# Patient Record
Sex: MALE | Race: OTHER | Employment: OTHER | URBAN - METROPOLITAN AREA
[De-identification: names, ages, dates, MRNs, and addresses within clinical notes are randomized per-mention and may not be internally consistent; named-entity substitution may affect disease eponyms.]

---

## 2017-06-27 PROBLEM — M81.0 OSTEOPOROSIS: Status: ACTIVE | Noted: 2017-06-27

## 2017-06-27 PROBLEM — I77.9 BILATERAL CAROTID ARTERY DISEASE (HCC): Status: ACTIVE | Noted: 2017-06-27

## 2017-06-29 PROCEDURE — 82607 VITAMIN B-12: CPT | Performed by: INTERNAL MEDICINE

## 2017-06-29 PROCEDURE — 82746 ASSAY OF FOLIC ACID SERUM: CPT | Performed by: INTERNAL MEDICINE

## 2017-07-26 PROCEDURE — 84165 PROTEIN E-PHORESIS SERUM: CPT | Performed by: INTERNAL MEDICINE

## 2017-07-26 PROCEDURE — 83883 ASSAY NEPHELOMETRY NOT SPEC: CPT | Performed by: INTERNAL MEDICINE

## 2017-07-26 PROCEDURE — 86334 IMMUNOFIX E-PHORESIS SERUM: CPT | Performed by: INTERNAL MEDICINE

## 2017-07-26 PROCEDURE — 84156 ASSAY OF PROTEIN URINE: CPT | Performed by: INTERNAL MEDICINE

## 2017-07-26 PROCEDURE — 86141 C-REACTIVE PROTEIN HS: CPT | Performed by: INTERNAL MEDICINE

## 2017-07-26 PROCEDURE — 86335 IMMUNFIX E-PHORSIS/URINE/CSF: CPT | Performed by: INTERNAL MEDICINE

## 2018-07-17 PROBLEM — I70.0 AORTIC ATHEROSCLEROSIS (HCC): Status: ACTIVE | Noted: 2018-07-17

## 2018-07-31 PROCEDURE — 86141 C-REACTIVE PROTEIN HS: CPT | Performed by: INTERNAL MEDICINE

## 2018-07-31 PROCEDURE — 84165 PROTEIN E-PHORESIS SERUM: CPT | Performed by: INTERNAL MEDICINE

## 2018-07-31 PROCEDURE — 83883 ASSAY NEPHELOMETRY NOT SPEC: CPT | Performed by: INTERNAL MEDICINE

## 2018-07-31 PROCEDURE — 86334 IMMUNOFIX E-PHORESIS SERUM: CPT | Performed by: INTERNAL MEDICINE

## 2018-08-28 PROBLEM — E44.1 MILD PROTEIN-CALORIE MALNUTRITION (HCC): Status: ACTIVE | Noted: 2018-08-28

## 2018-08-28 PROBLEM — D89.0 POLYCLONAL GAMMOPATHY DETERMINED BY SERUM PROTEIN ELECTROPHORESIS: Status: ACTIVE | Noted: 2018-08-28

## 2019-03-17 PROBLEM — R53.82 CHRONIC FATIGUE: Status: ACTIVE | Noted: 2019-03-17

## 2019-03-17 PROBLEM — G62.9 SMALL FIBER NEUROPATHY: Status: ACTIVE | Noted: 2019-03-17

## 2019-03-17 PROBLEM — I77.9 ARTERIAL DISEASE (HCC): Status: ACTIVE | Noted: 2019-03-17

## 2020-07-10 ENCOUNTER — LAB ENCOUNTER (OUTPATIENT)
Dept: LAB | Facility: HOSPITAL | Age: 85
End: 2020-07-10
Attending: INTERNAL MEDICINE
Payer: MEDICARE

## 2020-07-10 DIAGNOSIS — R63.4 WEIGHT LOSS: ICD-10-CM

## 2020-07-10 LAB
ALBUMIN SERPL-MCNC: 3.5 G/DL (ref 3.4–5)
ALBUMIN/GLOB SERPL: 0.9 {RATIO} (ref 1–2)
ALP LIVER SERPL-CCNC: 103 U/L (ref 45–117)
ALT SERPL-CCNC: 14 U/L (ref 16–61)
ANION GAP SERPL CALC-SCNC: 4 MMOL/L (ref 0–18)
AST SERPL-CCNC: 24 U/L (ref 15–37)
BASOPHILS # BLD AUTO: 0.02 X10(3) UL (ref 0–0.2)
BASOPHILS NFR BLD AUTO: 0.3 %
BILIRUB SERPL-MCNC: 0.7 MG/DL (ref 0.1–2)
BUN BLD-MCNC: 11 MG/DL (ref 7–18)
BUN/CREAT SERPL: 12.2 (ref 10–20)
CALCIUM BLD-MCNC: 8.7 MG/DL (ref 8.5–10.1)
CHLORIDE SERPL-SCNC: 88 MMOL/L (ref 98–112)
CO2 SERPL-SCNC: 31 MMOL/L (ref 21–32)
CREAT BLD-MCNC: 0.9 MG/DL (ref 0.7–1.3)
DEPRECATED RDW RBC AUTO: 43.6 FL (ref 35.1–46.3)
EOSINOPHIL # BLD AUTO: 0.13 X10(3) UL (ref 0–0.7)
EOSINOPHIL NFR BLD AUTO: 2.1 %
ERYTHROCYTE [DISTWIDTH] IN BLOOD BY AUTOMATED COUNT: 13.2 % (ref 11–15)
GLOBULIN PLAS-MCNC: 3.8 G/DL (ref 2.8–4.4)
GLUCOSE BLD-MCNC: 93 MG/DL (ref 70–99)
HCT VFR BLD AUTO: 40.2 % (ref 39–53)
HGB BLD-MCNC: 13.7 G/DL (ref 13–17.5)
IMM GRANULOCYTES # BLD AUTO: 0.03 X10(3) UL (ref 0–1)
IMM GRANULOCYTES NFR BLD: 0.5 %
LYMPHOCYTES # BLD AUTO: 1.15 X10(3) UL (ref 1–4)
LYMPHOCYTES NFR BLD AUTO: 18.3 %
M PROTEIN MFR SERPL ELPH: 7.3 G/DL (ref 6.4–8.2)
MCH RBC QN AUTO: 30.7 PG (ref 26–34)
MCHC RBC AUTO-ENTMCNC: 34.1 G/DL (ref 31–37)
MCV RBC AUTO: 90.1 FL (ref 80–100)
MONOCYTES # BLD AUTO: 0.65 X10(3) UL (ref 0.1–1)
MONOCYTES NFR BLD AUTO: 10.4 %
NEUTROPHILS # BLD AUTO: 4.3 X10 (3) UL (ref 1.5–7.7)
NEUTROPHILS # BLD AUTO: 4.3 X10(3) UL (ref 1.5–7.7)
NEUTROPHILS NFR BLD AUTO: 68.4 %
OSMOLALITY SERPL CALC.SUM OF ELEC: 255 MOSM/KG (ref 275–295)
PATIENT FASTING Y/N/NP: NO
PLATELET # BLD AUTO: 249 10(3)UL (ref 150–450)
POTASSIUM SERPL-SCNC: 4.3 MMOL/L (ref 3.5–5.1)
RBC # BLD AUTO: 4.46 X10(6)UL (ref 3.8–5.8)
SODIUM SERPL-SCNC: 123 MMOL/L (ref 136–145)
WBC # BLD AUTO: 6.3 X10(3) UL (ref 4–11)

## 2020-07-10 PROCEDURE — 80053 COMPREHEN METABOLIC PANEL: CPT

## 2020-07-10 PROCEDURE — 85025 COMPLETE CBC W/AUTO DIFF WBC: CPT

## 2020-07-10 PROCEDURE — 36415 COLL VENOUS BLD VENIPUNCTURE: CPT

## 2020-08-25 ENCOUNTER — HOSPITAL ENCOUNTER (OUTPATIENT)
Dept: CT IMAGING | Facility: HOSPITAL | Age: 85
Discharge: HOME | End: 2020-08-25
Attending: INTERNAL MEDICINE
Payer: MEDICARE

## 2020-08-25 ENCOUNTER — LAB ENCOUNTER (OUTPATIENT)
Dept: LAB | Facility: HOSPITAL | Age: 85
End: 2020-08-25
Attending: INTERNAL MEDICINE
Payer: MEDICARE

## 2020-08-25 DIAGNOSIS — R68.81 EARLY SATIETY: ICD-10-CM

## 2020-08-25 DIAGNOSIS — R19.4 CHANGE IN BOWEL HABITS: ICD-10-CM

## 2020-08-25 DIAGNOSIS — R63.4 WEIGHT LOSS: ICD-10-CM

## 2020-08-25 DIAGNOSIS — R53.82 CHRONIC FATIGUE: ICD-10-CM

## 2020-08-25 LAB
ALBUMIN SERPL-MCNC: 3.2 G/DL (ref 3.4–5)
ALBUMIN/GLOB SERPL: 0.9 {RATIO} (ref 1–2)
ALP LIVER SERPL-CCNC: 100 U/L (ref 45–117)
ALT SERPL-CCNC: 11 U/L (ref 16–61)
ANION GAP SERPL CALC-SCNC: 2 MMOL/L (ref 0–18)
AST SERPL-CCNC: 18 U/L (ref 15–37)
BILIRUB SERPL-MCNC: 0.5 MG/DL (ref 0.1–2)
BUN BLD-MCNC: 11 MG/DL (ref 7–18)
BUN/CREAT SERPL: 14.3 (ref 10–20)
CALCIUM BLD-MCNC: 9 MG/DL (ref 8.5–10.1)
CHLORIDE SERPL-SCNC: 92 MMOL/L (ref 98–112)
CO2 SERPL-SCNC: 33 MMOL/L (ref 21–32)
CREAT BLD-MCNC: 0.77 MG/DL (ref 0.7–1.3)
GLOBULIN PLAS-MCNC: 3.6 G/DL (ref 2.8–4.4)
GLUCOSE BLD-MCNC: 97 MG/DL (ref 70–99)
M PROTEIN MFR SERPL ELPH: 6.8 G/DL (ref 6.4–8.2)
OSMOLALITY SERPL CALC.SUM OF ELEC: 263 MOSM/KG (ref 275–295)
PATIENT FASTING Y/N/NP: YES
POTASSIUM SERPL-SCNC: 4.2 MMOL/L (ref 3.5–5.1)
SODIUM SERPL-SCNC: 127 MMOL/L (ref 136–145)

## 2020-08-25 PROCEDURE — 80053 COMPREHEN METABOLIC PANEL: CPT

## 2020-08-25 PROCEDURE — 74177 CT ABD & PELVIS W/CONTRAST: CPT | Performed by: INTERNAL MEDICINE

## 2020-08-25 PROCEDURE — 36415 COLL VENOUS BLD VENIPUNCTURE: CPT

## 2020-08-25 NOTE — PROGRESS NOTES
Please notify patient that his blood tests look like he needs more calories and more salt. No liver or kidney abnormalities.

## 2021-04-12 DIAGNOSIS — Z23 NEED FOR VACCINATION: ICD-10-CM

## 2021-05-23 PROBLEM — I65.23 BILATERAL CAROTID ARTERY STENOSIS: Status: ACTIVE | Noted: 2021-05-23

## 2021-05-23 PROBLEM — I77.9 BILATERAL CAROTID ARTERY DISEASE (HCC): Status: RESOLVED | Noted: 2017-06-27 | Resolved: 2021-05-23

## 2021-07-23 PROBLEM — S02.2XXA CLOSED FRACTURE OF NASAL BONE, INITIAL ENCOUNTER: Status: ACTIVE | Noted: 2021-07-23

## 2021-10-11 PROBLEM — E87.1 HYPONATREMIA: Status: ACTIVE | Noted: 2021-10-11

## 2021-11-16 ENCOUNTER — HOSPITAL ENCOUNTER (EMERGENCY)
Facility: HOSPITAL | Age: 86
Discharge: HOME | End: 2021-11-16
Attending: EMERGENCY MEDICINE
Payer: MEDICARE

## 2021-11-16 ENCOUNTER — APPOINTMENT (OUTPATIENT)
Dept: GENERAL RADIOLOGY | Facility: HOSPITAL | Age: 86
End: 2021-11-16
Attending: EMERGENCY MEDICINE
Payer: MEDICARE

## 2021-11-16 ENCOUNTER — APPOINTMENT (OUTPATIENT)
Dept: GENERAL RADIOLOGY | Facility: HOSPITAL | Age: 86
End: 2021-11-16
Payer: MEDICARE

## 2021-11-16 VITALS
DIASTOLIC BLOOD PRESSURE: 81 MMHG | HEIGHT: 66.5 IN | OXYGEN SATURATION: 98 % | HEART RATE: 84 BPM | WEIGHT: 90 LBS | TEMPERATURE: 97 F | RESPIRATION RATE: 24 BRPM | SYSTOLIC BLOOD PRESSURE: 192 MMHG | BODY MASS INDEX: 14.29 KG/M2

## 2021-11-16 DIAGNOSIS — S22.42XA CLOSED FRACTURE OF MULTIPLE RIBS OF LEFT SIDE, INITIAL ENCOUNTER: Primary | ICD-10-CM

## 2021-11-16 PROCEDURE — 99284 EMERGENCY DEPT VISIT MOD MDM: CPT | Performed by: EMERGENCY MEDICINE

## 2021-11-16 PROCEDURE — 71046 X-RAY EXAM CHEST 2 VIEWS: CPT | Performed by: EMERGENCY MEDICINE

## 2021-11-16 PROCEDURE — 71101 X-RAY EXAM UNILAT RIBS/CHEST: CPT

## 2021-11-16 RX ORDER — TRAMADOL HYDROCHLORIDE 50 MG/1
TABLET ORAL EVERY 6 HOURS PRN
Qty: 10 TABLET | Refills: 0 | Status: SHIPPED | OUTPATIENT
Start: 2021-11-16 | End: 2021-11-23

## 2021-11-16 RX ORDER — MORPHINE SULFATE 2 MG/ML
2 INJECTION, SOLUTION INTRAMUSCULAR; INTRAVENOUS EVERY 30 MIN PRN
Status: DISCONTINUED | OUTPATIENT
Start: 2021-11-16 | End: 2021-11-16

## 2021-11-16 NOTE — ED INITIAL ASSESSMENT (HPI)
Family states patient was shaving when he turned and fell hitting his left ribs on the toilet. Denies syncope/LOC. Pt c/o pain to left rib cage. Pt denies hitting head.

## 2021-11-16 NOTE — ED PROVIDER NOTES
Patient Seen in: [facility] Emergency Department      History   Patient presents with:  Fall    Stated Complaint: fall    Subjective:   HPI    This is a pleasant 44-year-old male presenting with fall.   Patient shaving when he went to turn and he los Patient was watched for several hours and the patient's repeat chest x-ray several hours later shows an improvement of the tiny apical pneumothorax. These were discussed with on-call pulmonologist for duly.   Patient will be I believe is a good outpatien

## 2021-12-20 ENCOUNTER — LAB ENCOUNTER (OUTPATIENT)
Dept: LAB | Facility: HOSPITAL | Age: 86
End: 2021-12-20
Attending: INTERNAL MEDICINE
Payer: MEDICARE

## 2021-12-20 DIAGNOSIS — E87.1 HYPONATREMIA: ICD-10-CM

## 2021-12-20 DIAGNOSIS — M62.81 MUSCLE WEAKNESS (GENERALIZED): ICD-10-CM

## 2021-12-20 DIAGNOSIS — R53.82 CHRONIC FATIGUE: ICD-10-CM

## 2021-12-20 PROCEDURE — 36415 COLL VENOUS BLD VENIPUNCTURE: CPT

## 2021-12-20 PROCEDURE — 82088 ASSAY OF ALDOSTERONE: CPT

## 2021-12-20 PROCEDURE — 84244 ASSAY OF RENIN: CPT

## 2021-12-20 PROCEDURE — 82533 TOTAL CORTISOL: CPT

## 2021-12-20 PROCEDURE — 82024 ASSAY OF ACTH: CPT

## 2022-01-12 PROBLEM — G31.9 CEREBRAL ATROPHY, MILD (HCC): Status: ACTIVE | Noted: 2022-01-12

## 2022-10-27 ENCOUNTER — ANESTHESIA (OUTPATIENT)
Dept: INTERVENTIONAL RADIOLOGY/VASCULAR | Facility: HOSPITAL | Age: 87
End: 2022-10-27
Payer: MEDICARE

## 2022-10-27 ENCOUNTER — APPOINTMENT (OUTPATIENT)
Dept: CT IMAGING | Facility: HOSPITAL | Age: 87
End: 2022-10-27
Attending: EMERGENCY MEDICINE
Payer: MEDICARE

## 2022-10-27 ENCOUNTER — APPOINTMENT (OUTPATIENT)
Dept: GENERAL RADIOLOGY | Facility: HOSPITAL | Age: 87
End: 2022-10-27
Attending: EMERGENCY MEDICINE
Payer: MEDICARE

## 2022-10-27 ENCOUNTER — HOSPITAL ENCOUNTER (INPATIENT)
Facility: HOSPITAL | Age: 87
LOS: 14 days | Discharge: NURSING, REHAB OR LONG-TERM CARE | End: 2022-11-10
Attending: EMERGENCY MEDICINE | Admitting: INTERNAL MEDICINE
Payer: MEDICARE

## 2022-10-27 ENCOUNTER — HOSPITAL ENCOUNTER (INPATIENT)
Facility: HOSPITAL | Age: 87
LOS: 14 days | Discharge: NURSING, REHAB OR LONG-TERM CARE | End: 2022-11-10
Attending: EMERGENCY MEDICINE | Admitting: INTERNAL MEDICINE
Payer: MEDICARE

## 2022-10-27 ENCOUNTER — APPOINTMENT (OUTPATIENT)
Dept: CT IMAGING | Facility: HOSPITAL | Age: 87
End: 2022-10-27
Payer: MEDICARE

## 2022-10-27 DIAGNOSIS — I63.9 CEREBROVASCULAR ACCIDENT (CVA), UNSPECIFIED MECHANISM (HCC): Primary | ICD-10-CM

## 2022-10-27 PROBLEM — R29.90 STROKE-LIKE SYMPTOM: Status: ACTIVE | Noted: 2022-10-27

## 2022-10-27 LAB
ALBUMIN SERPL-MCNC: 3 G/DL (ref 3.4–5)
ALBUMIN/GLOB SERPL: 0.7 {RATIO} (ref 1–2)
ALP LIVER SERPL-CCNC: 130 U/L
ALT SERPL-CCNC: 10 U/L
ANION GAP SERPL CALC-SCNC: 7 MMOL/L (ref 0–18)
APTT PPP: 29.2 SECONDS (ref 23.3–35.6)
ARTERIAL PATENCY WRIST A: POSITIVE
AST SERPL-CCNC: 19 U/L (ref 15–37)
ATRIAL RATE: 61 BPM
BASE EXCESS BLDA CALC-SCNC: 6.2 MMOL/L (ref ?–2)
BASOPHILS # BLD AUTO: 0.03 X10(3) UL (ref 0–0.2)
BASOPHILS NFR BLD AUTO: 0.3 %
BILIRUB SERPL-MCNC: 0.6 MG/DL (ref 0.1–2)
BODY TEMPERATURE: 98.6 F
BUN BLD-MCNC: 13 MG/DL (ref 7–18)
CA-I BLD-SCNC: 1.18 MMOL/L (ref 0.95–1.32)
CALCIUM BLD-MCNC: 9 MG/DL (ref 8.5–10.1)
CHLORIDE SERPL-SCNC: 93 MMOL/L (ref 98–112)
CHOLEST SERPL-MCNC: 211 MG/DL (ref ?–200)
CO2 SERPL-SCNC: 29 MMOL/L (ref 21–32)
COHGB MFR BLD: 1.4 % SAT (ref 0–3)
CREAT BLD-MCNC: 0.71 MG/DL
EOSINOPHIL # BLD AUTO: 0.2 X10(3) UL (ref 0–0.7)
EOSINOPHIL NFR BLD AUTO: 2.3 %
ERYTHROCYTE [DISTWIDTH] IN BLOOD BY AUTOMATED COUNT: 13.2 %
EST. AVERAGE GLUCOSE BLD GHB EST-MCNC: 126 MG/DL (ref 68–126)
FIO2: 21 %
GFR SERPLBLD BASED ON 1.73 SQ M-ARVRAT: 90 ML/MIN/1.73M2 (ref 60–?)
GLOBULIN PLAS-MCNC: 4.4 G/DL (ref 2.8–4.4)
GLUCOSE BLD-MCNC: 131 MG/DL (ref 70–99)
GLUCOSE BLD-MCNC: 135 MG/DL (ref 70–99)
GLUCOSE BLD-MCNC: 196 MG/DL (ref 70–99)
HBA1C MFR BLD: 6 % (ref ?–5.7)
HCO3 BLDA-SCNC: 29.7 MEQ/L (ref 21–27)
HCT VFR BLD AUTO: 43.9 %
HDLC SERPL-MCNC: 72 MG/DL (ref 40–59)
HGB BLD-MCNC: 14.7 G/DL
HGB BLD-MCNC: 14.9 G/DL
IMM GRANULOCYTES # BLD AUTO: 0.04 X10(3) UL (ref 0–1)
IMM GRANULOCYTES NFR BLD: 0.5 %
INR BLD: 0.98 (ref 0.85–1.16)
LACTATE BLD-SCNC: 1.3 MMOL/L (ref 0.5–2)
LDLC SERPL CALC-MCNC: 127 MG/DL (ref ?–100)
LYMPHOCYTES # BLD AUTO: 0.98 X10(3) UL (ref 1–4)
LYMPHOCYTES NFR BLD AUTO: 11.1 %
MCH RBC QN AUTO: 30.8 PG (ref 26–34)
MCHC RBC AUTO-ENTMCNC: 33.9 G/DL (ref 31–37)
MCV RBC AUTO: 90.9 FL
METHGB MFR BLD: 0.5 % SAT (ref 0.4–1.5)
MONOCYTES # BLD AUTO: 0.74 X10(3) UL (ref 0.1–1)
MONOCYTES NFR BLD AUTO: 8.4 %
NEUTROPHILS # BLD AUTO: 6.87 X10 (3) UL (ref 1.5–7.7)
NEUTROPHILS # BLD AUTO: 6.87 X10(3) UL (ref 1.5–7.7)
NEUTROPHILS NFR BLD AUTO: 77.4 %
NONHDLC SERPL-MCNC: 139 MG/DL (ref ?–130)
OSMOLALITY SERPL CALC.SUM OF ELEC: 270 MOSM/KG (ref 275–295)
OXYHGB MFR BLDA: 95.2 % (ref 92–100)
P-R INTERVAL: 122 MS
P2Y12 REACTION UNITS: 159 PRU
PCO2 BLDA: 42 MM HG (ref 35–45)
PH BLDA: 7.47 [PH] (ref 7.35–7.45)
PLATELET # BLD AUTO: 337 10(3)UL (ref 150–450)
PO2 BLDA: 79 MM HG (ref 80–100)
POTASSIUM BLD-SCNC: 4.2 MMOL/L (ref 3.6–5.1)
POTASSIUM SERPL-SCNC: 4.2 MMOL/L (ref 3.5–5.1)
PROT SERPL-MCNC: 7.4 G/DL (ref 6.4–8.2)
PROTHROMBIN TIME: 13 SECONDS (ref 11.6–14.8)
Q-T INTERVAL: 438 MS
QRS DURATION: 92 MS
QTC CALCULATION (BEZET): 440 MS
R AXIS: -54 DEGREES
RBC # BLD AUTO: 4.83 X10(6)UL
SARS-COV-2 RNA RESP QL NAA+PROBE: NOT DETECTED
SODIUM BLD-SCNC: 125 MMOL/L (ref 135–145)
SODIUM SERPL-SCNC: 129 MMOL/L (ref 136–145)
T AXIS: 38 DEGREES
TRIGL SERPL-MCNC: 66 MG/DL (ref 30–149)
TROPONIN I HIGH SENSITIVITY: 58 NG/L
VENTRICULAR RATE: 61 BPM
VLDLC SERPL CALC-MCNC: 12 MG/DL (ref 0–30)
WBC # BLD AUTO: 8.9 X10(3) UL (ref 4–11)

## 2022-10-27 PROCEDURE — 3E033PZ INTRODUCTION OF PLATELET INHIBITOR INTO PERIPHERAL VEIN, PERCUTANEOUS APPROACH: ICD-10-PCS

## 2022-10-27 PROCEDURE — 70498 CT ANGIOGRAPHY NECK: CPT | Performed by: EMERGENCY MEDICINE

## 2022-10-27 PROCEDURE — 70450 CT HEAD/BRAIN W/O DYE: CPT

## 2022-10-27 PROCEDURE — 0042T CT STROKE(DAWN BRAIN)CTA BRAIN/CTA NECK+PERF(CPT=70496/70498/0042T): CPT | Performed by: EMERGENCY MEDICINE

## 2022-10-27 PROCEDURE — C9460 INJECTION, CANGRELOR: HCPCS | Performed by: ANESTHESIOLOGY

## 2022-10-27 PROCEDURE — 99292 CRITICAL CARE ADDL 30 MIN: CPT | Performed by: INTERNAL MEDICINE

## 2022-10-27 PROCEDURE — 99291 CRITICAL CARE FIRST HOUR: CPT | Performed by: INTERNAL MEDICINE

## 2022-10-27 PROCEDURE — 03CL3ZZ EXTIRPATION OF MATTER FROM LEFT INTERNAL CAROTID ARTERY, PERCUTANEOUS APPROACH: ICD-10-PCS

## 2022-10-27 PROCEDURE — 76942 ECHO GUIDE FOR BIOPSY: CPT | Performed by: ANESTHESIOLOGY

## 2022-10-27 PROCEDURE — 3078F DIAST BP <80 MM HG: CPT | Performed by: INTERNAL MEDICINE

## 2022-10-27 PROCEDURE — 71045 X-RAY EXAM CHEST 1 VIEW: CPT | Performed by: EMERGENCY MEDICINE

## 2022-10-27 PROCEDURE — 70450 CT HEAD/BRAIN W/O DYE: CPT | Performed by: EMERGENCY MEDICINE

## 2022-10-27 PROCEDURE — B314YZZ FLUOROSCOPY OF LEFT COMMON CAROTID ARTERY USING OTHER CONTRAST: ICD-10-PCS

## 2022-10-27 PROCEDURE — 3077F SYST BP >= 140 MM HG: CPT | Performed by: INTERNAL MEDICINE

## 2022-10-27 PROCEDURE — 70496 CT ANGIOGRAPHY HEAD: CPT | Performed by: EMERGENCY MEDICINE

## 2022-10-27 RX ORDER — METOCLOPRAMIDE HYDROCHLORIDE 5 MG/ML
10 INJECTION INTRAMUSCULAR; INTRAVENOUS EVERY 8 HOURS PRN
Status: DISCONTINUED | OUTPATIENT
Start: 2022-10-27 | End: 2022-11-10

## 2022-10-27 RX ORDER — HYDRALAZINE HYDROCHLORIDE 20 MG/ML
INJECTION INTRAMUSCULAR; INTRAVENOUS
Status: COMPLETED
Start: 2022-10-27 | End: 2022-10-27

## 2022-10-27 RX ORDER — IODIXANOL 320 MG/ML
100 INJECTION, SOLUTION INTRAVASCULAR
Status: COMPLETED | OUTPATIENT
Start: 2022-10-27 | End: 2022-10-27

## 2022-10-27 RX ORDER — ACETAMINOPHEN 650 MG/1
650 SUPPOSITORY RECTAL EVERY 4 HOURS PRN
Status: DISCONTINUED | OUTPATIENT
Start: 2022-10-27 | End: 2022-11-10

## 2022-10-27 RX ORDER — HYDROMORPHONE HYDROCHLORIDE 1 MG/ML
0.6 INJECTION, SOLUTION INTRAMUSCULAR; INTRAVENOUS; SUBCUTANEOUS EVERY 5 MIN PRN
Status: ACTIVE | OUTPATIENT
Start: 2022-10-27 | End: 2022-10-28

## 2022-10-27 RX ORDER — ASPIRIN 300 MG/1
300 SUPPOSITORY RECTAL DAILY
Status: DISCONTINUED | OUTPATIENT
Start: 2022-10-27 | End: 2022-10-30

## 2022-10-27 RX ORDER — WATER 1000 ML/1000ML
INJECTION, SOLUTION INTRAVENOUS
Status: COMPLETED
Start: 2022-10-27 | End: 2022-10-27

## 2022-10-27 RX ORDER — ONDANSETRON 2 MG/ML
4 INJECTION INTRAMUSCULAR; INTRAVENOUS EVERY 6 HOURS PRN
Status: DISCONTINUED | OUTPATIENT
Start: 2022-10-27 | End: 2022-10-27

## 2022-10-27 RX ORDER — METOCLOPRAMIDE HYDROCHLORIDE 5 MG/ML
10 INJECTION INTRAMUSCULAR; INTRAVENOUS EVERY 8 HOURS PRN
Status: DISCONTINUED | OUTPATIENT
Start: 2022-10-27 | End: 2022-10-27

## 2022-10-27 RX ORDER — HYDROCODONE BITARTRATE AND ACETAMINOPHEN 10; 325 MG/1; MG/1
2 TABLET ORAL ONCE AS NEEDED
Status: ACTIVE | OUTPATIENT
Start: 2022-10-27 | End: 2022-10-27

## 2022-10-27 RX ORDER — LABETALOL HYDROCHLORIDE 5 MG/ML
INJECTION, SOLUTION INTRAVENOUS
Status: DISPENSED
Start: 2022-10-27 | End: 2022-10-28

## 2022-10-27 RX ORDER — HEPARIN SODIUM 5000 [USP'U]/ML
5000 INJECTION, SOLUTION INTRAVENOUS; SUBCUTANEOUS EVERY 12 HOURS SCHEDULED
Status: DISCONTINUED | OUTPATIENT
Start: 2022-10-27 | End: 2022-10-30

## 2022-10-27 RX ORDER — SODIUM CHLORIDE, SODIUM LACTATE, POTASSIUM CHLORIDE, CALCIUM CHLORIDE 600; 310; 30; 20 MG/100ML; MG/100ML; MG/100ML; MG/100ML
INJECTION, SOLUTION INTRAVENOUS CONTINUOUS
Status: DISCONTINUED | OUTPATIENT
Start: 2022-10-27 | End: 2022-10-27

## 2022-10-27 RX ORDER — SODIUM CHLORIDE 9 MG/ML
INJECTION, SOLUTION INTRAVENOUS CONTINUOUS
Status: ACTIVE | OUTPATIENT
Start: 2022-10-27 | End: 2022-10-29

## 2022-10-27 RX ORDER — MIDAZOLAM HYDROCHLORIDE 1 MG/ML
1 INJECTION INTRAMUSCULAR; INTRAVENOUS EVERY 5 MIN PRN
Status: ACTIVE | OUTPATIENT
Start: 2022-10-27 | End: 2022-10-28

## 2022-10-27 RX ORDER — CEFAZOLIN SODIUM/WATER 2 G/20 ML
SYRINGE (ML) INTRAVENOUS
Status: DISCONTINUED
Start: 2022-10-27 | End: 2022-10-27 | Stop reason: WASHOUT

## 2022-10-27 RX ORDER — LABETALOL HYDROCHLORIDE 5 MG/ML
5 INJECTION, SOLUTION INTRAVENOUS EVERY 5 MIN PRN
Status: ACTIVE | OUTPATIENT
Start: 2022-10-27 | End: 2022-10-28

## 2022-10-27 RX ORDER — LABETALOL HYDROCHLORIDE 5 MG/ML
10 INJECTION, SOLUTION INTRAVENOUS EVERY 10 MIN PRN
Status: COMPLETED | OUTPATIENT
Start: 2022-10-27 | End: 2022-10-28

## 2022-10-27 RX ORDER — ONDANSETRON 2 MG/ML
INJECTION INTRAMUSCULAR; INTRAVENOUS AS NEEDED
Status: DISCONTINUED | OUTPATIENT
Start: 2022-10-27 | End: 2022-10-27 | Stop reason: SURG

## 2022-10-27 RX ORDER — SENNOSIDES 8.6 MG
17.2 TABLET ORAL NIGHTLY PRN
Status: DISCONTINUED | OUTPATIENT
Start: 2022-10-27 | End: 2022-11-10

## 2022-10-27 RX ORDER — NALOXONE HYDROCHLORIDE 0.4 MG/ML
80 INJECTION, SOLUTION INTRAMUSCULAR; INTRAVENOUS; SUBCUTANEOUS AS NEEDED
Status: ACTIVE | OUTPATIENT
Start: 2022-10-27 | End: 2022-10-28

## 2022-10-27 RX ORDER — ASPIRIN 325 MG
325 TABLET, DELAYED RELEASE (ENTERIC COATED) ORAL DAILY
Status: DISCONTINUED | OUTPATIENT
Start: 2022-10-27 | End: 2022-10-30

## 2022-10-27 RX ORDER — VERAPAMIL HYDROCHLORIDE 2.5 MG/ML
INJECTION, SOLUTION INTRAVENOUS
Status: DISCONTINUED
Start: 2022-10-27 | End: 2022-10-27 | Stop reason: WASHOUT

## 2022-10-27 RX ORDER — HYDROMORPHONE HYDROCHLORIDE 1 MG/ML
0.4 INJECTION, SOLUTION INTRAMUSCULAR; INTRAVENOUS; SUBCUTANEOUS EVERY 5 MIN PRN
Status: ACTIVE | OUTPATIENT
Start: 2022-10-27 | End: 2022-10-28

## 2022-10-27 RX ORDER — ASPIRIN 325 MG
325 TABLET ORAL DAILY
Status: DISCONTINUED | OUTPATIENT
Start: 2022-10-27 | End: 2022-10-27

## 2022-10-27 RX ORDER — HYDRALAZINE HYDROCHLORIDE 20 MG/ML
10 INJECTION INTRAMUSCULAR; INTRAVENOUS EVERY 2 HOUR PRN
Status: DISCONTINUED | OUTPATIENT
Start: 2022-10-27 | End: 2022-11-10

## 2022-10-27 RX ORDER — ONDANSETRON 2 MG/ML
4 INJECTION INTRAMUSCULAR; INTRAVENOUS EVERY 6 HOURS PRN
Status: DISCONTINUED | OUTPATIENT
Start: 2022-10-27 | End: 2022-11-08 | Stop reason: DRUGHIGH

## 2022-10-27 RX ORDER — DEXAMETHASONE SODIUM PHOSPHATE 4 MG/ML
VIAL (ML) INJECTION AS NEEDED
Status: DISCONTINUED | OUTPATIENT
Start: 2022-10-27 | End: 2022-10-27 | Stop reason: SURG

## 2022-10-27 RX ORDER — SODIUM CHLORIDE 9 MG/ML
INJECTION, SOLUTION INTRAVENOUS CONTINUOUS PRN
Status: DISCONTINUED | OUTPATIENT
Start: 2022-10-27 | End: 2022-10-27 | Stop reason: SURG

## 2022-10-27 RX ORDER — HYDROMORPHONE HYDROCHLORIDE 1 MG/ML
0.2 INJECTION, SOLUTION INTRAMUSCULAR; INTRAVENOUS; SUBCUTANEOUS EVERY 5 MIN PRN
Status: ACTIVE | OUTPATIENT
Start: 2022-10-27 | End: 2022-10-28

## 2022-10-27 RX ORDER — SODIUM PHOSPHATE, DIBASIC AND SODIUM PHOSPHATE, MONOBASIC 7; 19 G/133ML; G/133ML
1 ENEMA RECTAL ONCE AS NEEDED
Status: DISCONTINUED | OUTPATIENT
Start: 2022-10-27 | End: 2022-11-10

## 2022-10-27 RX ORDER — METOCLOPRAMIDE HYDROCHLORIDE 5 MG/ML
INJECTION INTRAMUSCULAR; INTRAVENOUS AS NEEDED
Status: DISCONTINUED | OUTPATIENT
Start: 2022-10-27 | End: 2022-10-27 | Stop reason: SURG

## 2022-10-27 RX ORDER — ACETAMINOPHEN 325 MG/1
650 TABLET ORAL EVERY 4 HOURS PRN
Status: DISCONTINUED | OUTPATIENT
Start: 2022-10-27 | End: 2022-11-10

## 2022-10-27 RX ORDER — MEPERIDINE HYDROCHLORIDE 25 MG/ML
12.5 INJECTION INTRAMUSCULAR; INTRAVENOUS; SUBCUTANEOUS AS NEEDED
Status: DISCONTINUED | OUTPATIENT
Start: 2022-10-27 | End: 2022-10-29

## 2022-10-27 RX ORDER — MORPHINE SULFATE 2 MG/ML
1 INJECTION, SOLUTION INTRAMUSCULAR; INTRAVENOUS EVERY 2 HOUR PRN
Status: DISCONTINUED | OUTPATIENT
Start: 2022-10-27 | End: 2022-10-29

## 2022-10-27 RX ORDER — NOREPINEPHRINE BITARTRATE 1 MG/ML
INJECTION, SOLUTION INTRAVENOUS
Status: COMPLETED
Start: 2022-10-27 | End: 2022-10-27

## 2022-10-27 RX ORDER — BISACODYL 10 MG
10 SUPPOSITORY, RECTAL RECTAL
Status: DISCONTINUED | OUTPATIENT
Start: 2022-10-27 | End: 2022-11-10

## 2022-10-27 RX ORDER — ATORVASTATIN CALCIUM 40 MG/1
40 TABLET, FILM COATED ORAL NIGHTLY
Status: DISCONTINUED | OUTPATIENT
Start: 2022-10-27 | End: 2022-11-10

## 2022-10-27 RX ORDER — ASPIRIN 300 MG/1
300 SUPPOSITORY RECTAL DAILY
Status: DISCONTINUED | OUTPATIENT
Start: 2022-10-27 | End: 2022-10-27

## 2022-10-27 RX ORDER — MELATONIN
3 NIGHTLY PRN
Status: DISCONTINUED | OUTPATIENT
Start: 2022-10-27 | End: 2022-11-10

## 2022-10-27 RX ORDER — MORPHINE SULFATE 2 MG/ML
2 INJECTION, SOLUTION INTRAMUSCULAR; INTRAVENOUS EVERY 2 HOUR PRN
Status: DISCONTINUED | OUTPATIENT
Start: 2022-10-27 | End: 2022-10-29

## 2022-10-27 RX ORDER — HYDROCODONE BITARTRATE AND ACETAMINOPHEN 10; 325 MG/1; MG/1
1 TABLET ORAL ONCE AS NEEDED
Status: ACTIVE | OUTPATIENT
Start: 2022-10-27 | End: 2022-10-27

## 2022-10-27 RX ORDER — IOHEXOL 350 MG/ML
100 INJECTION, SOLUTION INTRAVENOUS
Status: COMPLETED | OUTPATIENT
Start: 2022-10-27 | End: 2022-10-27

## 2022-10-27 RX ORDER — LIDOCAINE HYDROCHLORIDE 10 MG/ML
INJECTION, SOLUTION INFILTRATION; PERINEURAL
Status: COMPLETED
Start: 2022-10-27 | End: 2022-10-27

## 2022-10-27 RX ORDER — HEPARIN SODIUM 5000 [USP'U]/ML
INJECTION, SOLUTION INTRAVENOUS; SUBCUTANEOUS
Status: COMPLETED
Start: 2022-10-27 | End: 2022-10-27

## 2022-10-27 RX ORDER — DEXTROSE AND SODIUM CHLORIDE 5; .45 G/100ML; G/100ML
INJECTION, SOLUTION INTRAVENOUS CONTINUOUS
Status: CANCELLED | OUTPATIENT
Start: 2022-10-27 | End: 2022-10-27

## 2022-10-27 RX ORDER — ACETAMINOPHEN 500 MG
1000 TABLET ORAL ONCE AS NEEDED
Status: ACTIVE | OUTPATIENT
Start: 2022-10-27 | End: 2022-10-27

## 2022-10-27 RX ORDER — CEFAZOLIN SODIUM/WATER 2 G/20 ML
SYRINGE (ML) INTRAVENOUS
Status: COMPLETED
Start: 2022-10-27 | End: 2022-10-27

## 2022-10-27 RX ORDER — CEFAZOLIN SODIUM/WATER 2 G/20 ML
SYRINGE (ML) INTRAVENOUS AS NEEDED
Status: DISCONTINUED | OUTPATIENT
Start: 2022-10-27 | End: 2022-10-27 | Stop reason: SURG

## 2022-10-27 RX ORDER — HEPARIN SODIUM 1000 [USP'U]/ML
INJECTION, SOLUTION INTRAVENOUS; SUBCUTANEOUS
Status: COMPLETED
Start: 2022-10-27 | End: 2022-10-27

## 2022-10-27 RX ORDER — POLYETHYLENE GLYCOL 3350 17 G/17G
17 POWDER, FOR SOLUTION ORAL DAILY PRN
Status: DISCONTINUED | OUTPATIENT
Start: 2022-10-27 | End: 2022-11-10

## 2022-10-27 RX ADMIN — METOCLOPRAMIDE HYDROCHLORIDE 10 MG: 5 INJECTION INTRAMUSCULAR; INTRAVENOUS at 15:25:00

## 2022-10-27 RX ADMIN — SODIUM CHLORIDE: 9 INJECTION, SOLUTION INTRAVENOUS at 13:59:00

## 2022-10-27 RX ADMIN — CEFAZOLIN SODIUM/WATER 2 G: 2 G/20 ML SYRINGE (ML) INTRAVENOUS at 14:16:00

## 2022-10-27 RX ADMIN — DEXAMETHASONE SODIUM PHOSPHATE 8 MG: 4 MG/ML VIAL (ML) INJECTION at 15:25:00

## 2022-10-27 RX ADMIN — ONDANSETRON 4 MG: 2 INJECTION INTRAMUSCULAR; INTRAVENOUS at 15:25:00

## 2022-10-27 NOTE — ANESTHESIA PROCEDURE NOTES
Arterial Line    Date/Time: 10/27/2022 2:10 PM  Performed by: Kyaw Serna MD  Authorized by: Kyaw Serna MD     General Information and Staff    Procedure Start:  10/27/2022 2:10 PM  Procedure End:  10/27/2022 2:18 PM  Anesthesiologist:  Kyaw Serna MD  Performed By:  Anesthesiologist  Patient Location:  OR  Indication: continuous blood pressure monitoring and blood sampling needed    Site Identification: surface landmarks    Preanesthetic Checklist: 2 patient identifiers, IV checked, risks and benefits discussed, monitors and equipment checked, pre-op evaluation, timeout performed, anesthesia consent and sterile technique used    Procedure Details    Catheter Size:  20 G  Catheter Length:  1 and 3/4 inch  Catheter Type:  Arrow  Seldinger Technique?: Yes    Laterality:  Left  Site:  Brachial artery  Site Prep: chlorhexidine    Line Secured:  Wrist Brace, tape and Tegaderm    Assessment    Events: patient tolerated procedure well with no complications      Medications  10/27/2022 2:10 PM      Additional Comments

## 2022-10-27 NOTE — ED QUICK NOTES
Pt to CT w/2RNS & tech; on monitor; w/stroke navigator; w/labatolol to give in 97KSBH A/>404 systolic BP per MD

## 2022-10-27 NOTE — PROGRESS NOTES
Called to ED at 1307 for Code Stroke/Code Shasha  Patient arriving via EMS into room A3  Last Known Normal at 0230 AM today  Pre-morbid mRS 1-2    Initial NIHSS 21 (Performed by ED)  Pt accompanied to Georgi Bill (Neuro Critical Care) notified at 1664  Dr Marielena Mcarthur (Neuro Interventional Radiology) notified at [phone]  Repeat NIHSS completed back in ED room    NIH Stroke Scale  1a  Level of consciousness: 0   1b. LOC questions:  2   1c. LOC commands: 2   2. Best Gaze: 1 (Gaze to L)   3. Visual: 0   4. Facial Palsy: 0   5a. Motor left arm: 1 (Drift present)   5b. Motor right arm: 4 (Extremity flaccid)   6a. Motor left le (Drift present)   6b  Motor right le (Extremity flaccid)   7. Limb Ataxia: 0   8. Sensory: 1 (Minimal response to R side of body)   9. Best Language:  3 (Mute)   10. Dysarthria: 2 (Mute)   11.  Extinction and Inattention: 0     Total:   21      Per Dr Viky Bill, patient is not a candidate for tPA as patient is out of time window considering LKW  Per Dr Marielena Mcarthur, patient is a candidate for neuro intervention as LVO present on imaging  Case discussed with Dr Ron Rodas, ED  Consent printed and signed by patient's son, all pertinent questions and concerns addressed, patient transferred from ED to NI lab   Family seated in waiting room while patient in procedure

## 2022-10-27 NOTE — PROCEDURES
[facility]  Interventional Neuroradiology Procedure Note      Procedure: Mechanical Thrombectomy of Cervical Left Internal Carotid Artery    Pre-Op Diagnosis: LICA cervical atherosclerotic plaque/occlusion    Post-Op Diagnosis: s/p thrombectomy    Surgeon: Paris Fuchs MD, PhD    Technique/Findings:    6F Right CFA Neuron  Sheath   5F 130 cm Penumbra Wong catheter    Stella 070 aspiration thrombectomy    IV cangrelor    Successful mechanical thrombectomy of cervical LICA with aspiration resulting in TICI 2c reperfusion     Underlying irregular, symptomatic proximal cervical LICA atherosclerotic plaque resulting in 60-70% stenosis without significant flow limitation and stable on cangrelor infusion. 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 Subsequent CT/CTA/CT brain perfusion head studies demonstrated small core infarct in the left parietal lobe (ASPECTS 9) and large ischemic penumbra in left cerebral hemisphere secondary to cervical LICA occlusion status post thrombectomy resulting in TICI 2c reperfusion without significant flow limitation on cangrelor infusion due to underlying symptomatic ruptured atherosclerotic plaque resulting in proximal cervical  70-08% LICA stenosis with tandem distal cervical 50% atherosclerotic stenosis.     Neuro ICU monitoring - Neurochecks q1h  SBP goals 120-150  mm Hg to prevent reperfusion or infarct transformation complications, but maintain antegrade flow across symptomatic LICA stenosis status post revascularization  Post-procedure dual energy CT demonstrated minimal contrast staining in region of evolving core infarcts, no intracranial hemorrhage  24h CT head and MRI/MRA brain studies with evolving core/final infarction volume, vessel patency, exclude ICH complications    Stroke Etiology/Prevention  Neurology/Cardiology evaluation  Cardiac telemetry monitoring     Dual antiplatelet therapy ASA and cangrelor IV   Check P2Y12 and titrate cangrelor dose  Gradual transition to clopidogrel after excluding ICH complications    Consider delayed ~ 1 week carotid revascularization of symptomatic LICA origin stenosis in this high risk patient (carotid stenting vs CEA)    Lower extremity Doppler ultrasound to exclude DVT/paradoxical embolus  DVT prophylaxis - heparin 5000U SQ BID     Stroke Rehabilitation  PT/OT/ST    Lay flat or reverse Trendelenburg 6 hours   Assess vitals/access site/pulses and neurological status in post-procedure unit      Williams Nunes MD, PhD  10/27/2022  5:13 PM

## 2022-10-27 NOTE — ADDENDUM NOTE
Addendum  created 10/27/22 1717 by Quang Holloway MD    Child order released for a procedure order, Clinical Note Signed, Intraprocedure Blocks edited, LDA created via procedure documentation, LDA updated via procedure documentation, SmartForm saved

## 2022-10-27 NOTE — ED QUICK NOTES
Pt back from CT; tolerated well; family at bedside; stroke navigator updated them; pt appears to being eye his family

## 2022-10-28 ENCOUNTER — APPOINTMENT (OUTPATIENT)
Dept: MRI IMAGING | Facility: HOSPITAL | Age: 87
End: 2022-10-28
Payer: MEDICARE

## 2022-10-28 ENCOUNTER — APPOINTMENT (OUTPATIENT)
Dept: CV DIAGNOSTICS | Facility: HOSPITAL | Age: 87
End: 2022-10-28
Attending: INTERNAL MEDICINE
Payer: MEDICARE

## 2022-10-28 ENCOUNTER — APPOINTMENT (OUTPATIENT)
Dept: CT IMAGING | Facility: HOSPITAL | Age: 87
End: 2022-10-28
Payer: MEDICARE

## 2022-10-28 LAB
ALBUMIN SERPL-MCNC: 2.6 G/DL (ref 3.4–5)
ALBUMIN/GLOB SERPL: 0.7 {RATIO} (ref 1–2)
ALP LIVER SERPL-CCNC: 118 U/L
ALT SERPL-CCNC: 8 U/L
ANION GAP SERPL CALC-SCNC: 9 MMOL/L (ref 0–18)
AST SERPL-CCNC: 22 U/L (ref 15–37)
ATRIAL RATE: 75 BPM
BASOPHILS # BLD AUTO: 0.01 X10(3) UL (ref 0–0.2)
BASOPHILS NFR BLD AUTO: 0.1 %
BILIRUB SERPL-MCNC: 0.5 MG/DL (ref 0.1–2)
BUN BLD-MCNC: 20 MG/DL (ref 7–18)
CALCIUM BLD-MCNC: 8.6 MG/DL (ref 8.5–10.1)
CHLORIDE SERPL-SCNC: 105 MMOL/L (ref 98–112)
CO2 SERPL-SCNC: 21 MMOL/L (ref 21–32)
CREAT BLD-MCNC: 1 MG/DL
EOSINOPHIL # BLD AUTO: 0 X10(3) UL (ref 0–0.7)
EOSINOPHIL NFR BLD AUTO: 0 %
ERYTHROCYTE [DISTWIDTH] IN BLOOD BY AUTOMATED COUNT: 13.2 %
GFR SERPLBLD BASED ON 1.73 SQ M-ARVRAT: 74 ML/MIN/1.73M2 (ref 60–?)
GLOBULIN PLAS-MCNC: 4 G/DL (ref 2.8–4.4)
GLUCOSE BLD-MCNC: 118 MG/DL (ref 70–99)
GLUCOSE BLD-MCNC: 118 MG/DL (ref 70–99)
GLUCOSE BLD-MCNC: 121 MG/DL (ref 70–99)
GLUCOSE BLD-MCNC: 131 MG/DL (ref 70–99)
GLUCOSE BLD-MCNC: 135 MG/DL (ref 70–99)
GLUCOSE BLD-MCNC: 149 MG/DL (ref 70–99)
HCT VFR BLD AUTO: 38.1 %
HGB BLD-MCNC: 12.9 G/DL
IMM GRANULOCYTES # BLD AUTO: 0.07 X10(3) UL (ref 0–1)
IMM GRANULOCYTES NFR BLD: 0.6 %
LYMPHOCYTES # BLD AUTO: 0.31 X10(3) UL (ref 1–4)
LYMPHOCYTES NFR BLD AUTO: 2.5 %
MCH RBC QN AUTO: 31 PG (ref 26–34)
MCHC RBC AUTO-ENTMCNC: 33.9 G/DL (ref 31–37)
MCV RBC AUTO: 91.6 FL
MONOCYTES # BLD AUTO: 0.81 X10(3) UL (ref 0.1–1)
MONOCYTES NFR BLD AUTO: 6.6 %
NEUTROPHILS # BLD AUTO: 11.02 X10 (3) UL (ref 1.5–7.7)
NEUTROPHILS # BLD AUTO: 11.02 X10(3) UL (ref 1.5–7.7)
NEUTROPHILS NFR BLD AUTO: 90.2 %
OSMOLALITY SERPL CALC.SUM OF ELEC: 285 MOSM/KG (ref 275–295)
P AXIS: 58 DEGREES
P-R INTERVAL: 138 MS
P2Y12 REACTION UNITS: 127 PRU
PLATELET # BLD AUTO: 295 10(3)UL (ref 150–450)
POTASSIUM SERPL-SCNC: 3.8 MMOL/L (ref 3.5–5.1)
PROT SERPL-MCNC: 6.6 G/DL (ref 6.4–8.2)
Q-T INTERVAL: 416 MS
QRS DURATION: 96 MS
QTC CALCULATION (BEZET): 464 MS
R AXIS: 96 DEGREES
RBC # BLD AUTO: 4.16 X10(6)UL
SODIUM SERPL-SCNC: 135 MMOL/L (ref 136–145)
T AXIS: 26 DEGREES
VENTRICULAR RATE: 75 BPM
WBC # BLD AUTO: 12.2 X10(3) UL (ref 4–11)

## 2022-10-28 PROCEDURE — 70450 CT HEAD/BRAIN W/O DYE: CPT

## 2022-10-28 PROCEDURE — 93306 TTE W/DOPPLER COMPLETE: CPT | Performed by: INTERNAL MEDICINE

## 2022-10-28 PROCEDURE — 70549 MR ANGIOGRAPH NECK W/O&W/DYE: CPT

## 2022-10-28 PROCEDURE — 99291 CRITICAL CARE FIRST HOUR: CPT | Performed by: INTERNAL MEDICINE

## 2022-10-28 PROCEDURE — 99222 1ST HOSP IP/OBS MODERATE 55: CPT

## 2022-10-28 PROCEDURE — 70553 MRI BRAIN STEM W/O & W/DYE: CPT

## 2022-10-28 PROCEDURE — 70546 MR ANGIOGRAPH HEAD W/O&W/DYE: CPT

## 2022-10-28 PROCEDURE — 99292 CRITICAL CARE ADDL 30 MIN: CPT | Performed by: INTERNAL MEDICINE

## 2022-10-28 RX ORDER — MULTIPLE VITAMINS W/ MINERALS TAB 9MG-400MCG
1 TAB ORAL DAILY
Status: DISCONTINUED | OUTPATIENT
Start: 2022-10-29 | End: 2022-11-10

## 2022-10-28 RX ORDER — DIPHENHYDRAMINE HYDROCHLORIDE 50 MG/ML
10 INJECTION, SOLUTION INTRAMUSCULAR; INTRAVENOUS
Status: COMPLETED | OUTPATIENT
Start: 2022-10-28 | End: 2022-10-28

## 2022-10-28 RX ORDER — POTASSIUM CHLORIDE 1.5 G/1.77G
40 POWDER, FOR SOLUTION ORAL ONCE
Status: COMPLETED | OUTPATIENT
Start: 2022-10-28 | End: 2022-10-28

## 2022-10-28 NOTE — PLAN OF CARE
Pt has been hemodynamically stable. BP parameters expanded, thus no longer requiring cardene gtt. Pt with some neurologic improvements noted. Pt speaking a few words mixing his native language with [place]. Per family some words are slurred. Pt able to cross midline visual field to the right with prompting. Pt occasionally following some commands. Slight intermittent movement noted of R side. Neuro checks decreased to Q2h. Pt participating with therapies today. Advanced diet as recommended. Cangrelor gtt continues to run. IVF remain for hydration. Pt continues to have urinary retention requiring intermittent straight cath. Echo, 24 hr CT and MRI/MRA completed today. Noted short run of non-sustained afib noted with rate to 140's; Neuro Intensivist notified of event. Family at bedside throughout the day and involved in pt care. 1845: received critical results of MRI/MRA. Results reported to Dr Rosio Esparza. Pt with improvements in neuro exam throughout the day thus no intervention at this time. Continue cangrelor. To call for decline in neuro status.

## 2022-10-28 NOTE — PLAN OF CARE
Pt received post procedure from Neuro Intervention lab. Pt received on oxygen mask; weaned to nasal canula. Pt received on Nicardipine and Cangrelor gtts. Titrating nicardipine for bp goal of 120-150. L brachial arterial line present but positional thus inaccurate readings. Following cuff pressures for medication dose adjustments. Right groin site soft and non-tender with palpable pedal pulse present. Frequent neuro checks post procedure; see flowsheet for details. Pt not following commands; global aphasia. R sided deficits noted. Spontaneous L sided movement. P2Y12 levels reported to Dr Britt Hager; continue cangrelor at current rate. Plan for repeat CT scan tomorrow afternoon. Family updated on condition and plan of care.

## 2022-10-28 NOTE — PLAN OF CARE
Assumed care at approximately 1930. Q1 neuro checks. Global aphasia. Pupils are equal, round, reactive. R facial droop noted. LUE & LLE spontaneous movements, not following commands. RUE & RLE withdrawing to pain. Weaned to room air. SBP goal 120-150. Deepali positional, going off cuff. Cardene gtt weaned off. R groin C/D/I, soft, no hematoma. R pedal pulse +1. Required straight cath, please refer to I/O flowsheet. NPO. Cangrelor gtt infusing. Plan repeat head CT 10/28 afternoon. Problem: NEUROLOGICAL - ADULT  Goal: Achieves stable or improved neurological status  Description: INTERVENTIONS  - Assess for and report changes in neurological status  - Initiate measures to prevent increased intracranial pressure  - Maintain blood pressure and fluid volume within ordered parameters to optimize cerebral perfusion and minimize risk of hemorrhage  - Monitor temperature, glucose, and sodium.  Initiate appropriate interventions as ordered  Outcome: Progressing  Goal: Absence of seizures  Description: INTERVENTIONS  - Monitor for seizure activity  - Administer anti-seizure medications as ordered  - Monitor neurological status  Outcome: Progressing  Goal: Remains free of injury related to seizure activity  Description: INTERVENTIONS:  - Maintain airway, patient safety  and administer oxygen as ordered  - Monitor patient for seizure activity, document and report duration and description of seizure to MD/LIP  - If seizure occurs, turn patient to side and suction secretions as needed  - Reorient patient post seizure  - Seizure pads on all 4 side rails  - Instruct patient/family to notify RN of any seizure activity  - Instruct patient/family to call for assistance with activity based on assessment  Outcome: Progressing  Goal: Achieves maximal functionality and self care  Description: INTERVENTIONS  - Monitor swallowing and airway patency with patient fatigue and changes in neurological status  - Encourage and assist patient to increase activity and self care with guidance from PT/OT  - Encourage visually impaired, hearing impaired and aphasic patients to use assistive/communication devices  Outcome: Progressing

## 2022-10-29 ENCOUNTER — APPOINTMENT (OUTPATIENT)
Dept: ULTRASOUND IMAGING | Facility: HOSPITAL | Age: 87
End: 2022-10-29
Payer: MEDICARE

## 2022-10-29 LAB
ANION GAP SERPL CALC-SCNC: 7 MMOL/L (ref 0–18)
ATRIAL RATE: 88 BPM
BUN BLD-MCNC: 21 MG/DL (ref 7–18)
CALCIUM BLD-MCNC: 8.4 MG/DL (ref 8.5–10.1)
CHLORIDE SERPL-SCNC: 107 MMOL/L (ref 98–112)
CO2 SERPL-SCNC: 26 MMOL/L (ref 21–32)
CREAT BLD-MCNC: 0.61 MG/DL
ERYTHROCYTE [DISTWIDTH] IN BLOOD BY AUTOMATED COUNT: 13.7 %
GFR SERPLBLD BASED ON 1.73 SQ M-ARVRAT: 95 ML/MIN/1.73M2 (ref 60–?)
GLUCOSE BLD-MCNC: 106 MG/DL (ref 70–99)
GLUCOSE BLD-MCNC: 115 MG/DL (ref 70–99)
GLUCOSE BLD-MCNC: 115 MG/DL (ref 70–99)
GLUCOSE BLD-MCNC: 121 MG/DL (ref 70–99)
HCT VFR BLD AUTO: 35.2 %
HGB BLD-MCNC: 11.8 G/DL
MAGNESIUM SERPL-MCNC: 1.9 MG/DL (ref 1.6–2.6)
MCH RBC QN AUTO: 30.8 PG (ref 26–34)
MCHC RBC AUTO-ENTMCNC: 33.5 G/DL (ref 31–37)
MCV RBC AUTO: 91.9 FL
OSMOLALITY SERPL CALC.SUM OF ELEC: 293 MOSM/KG (ref 275–295)
PLATELET # BLD AUTO: 266 10(3)UL (ref 150–450)
POTASSIUM SERPL-SCNC: 3.3 MMOL/L (ref 3.5–5.1)
POTASSIUM SERPL-SCNC: 3.3 MMOL/L (ref 3.5–5.1)
Q-T INTERVAL: 360 MS
QRS DURATION: 86 MS
QTC CALCULATION (BEZET): 435 MS
R AXIS: -47 DEGREES
RBC # BLD AUTO: 3.83 X10(6)UL
SODIUM SERPL-SCNC: 140 MMOL/L (ref 136–145)
T AXIS: 64 DEGREES
VENTRICULAR RATE: 88 BPM
WBC # BLD AUTO: 11.9 X10(3) UL (ref 4–11)

## 2022-10-29 PROCEDURE — 99292 CRITICAL CARE ADDL 30 MIN: CPT | Performed by: INTERNAL MEDICINE

## 2022-10-29 PROCEDURE — 93970 EXTREMITY STUDY: CPT

## 2022-10-29 PROCEDURE — 99291 CRITICAL CARE FIRST HOUR: CPT | Performed by: INTERNAL MEDICINE

## 2022-10-29 RX ORDER — CLOPIDOGREL BISULFATE 75 MG/1
600 TABLET ORAL ONCE
Status: COMPLETED | OUTPATIENT
Start: 2022-10-29 | End: 2022-10-29

## 2022-10-29 RX ORDER — ASPIRIN 81 MG/1
81 TABLET, CHEWABLE ORAL DAILY
Status: DISCONTINUED | OUTPATIENT
Start: 2022-10-29 | End: 2022-11-04

## 2022-10-29 RX ORDER — DILTIAZEM HYDROCHLORIDE 5 MG/ML
10 INJECTION INTRAVENOUS ONCE
Status: COMPLETED | OUTPATIENT
Start: 2022-10-29 | End: 2022-10-29

## 2022-10-29 RX ORDER — CLOPIDOGREL BISULFATE 75 MG/1
75 TABLET ORAL DAILY
Status: DISCONTINUED | OUTPATIENT
Start: 2022-10-30 | End: 2022-10-30

## 2022-10-29 RX ORDER — METOPROLOL TARTRATE 5 MG/5ML
5 INJECTION INTRAVENOUS ONCE
Status: COMPLETED | OUTPATIENT
Start: 2022-10-29 | End: 2022-10-29

## 2022-10-29 NOTE — PLAN OF CARE
Assumed care of the pt at 0730, see flow sheet for neuro status, vss, afebrile, card gtt and IVP per Emar. Pt in and out of afib all shift. MD aware, rate now controlled. Called NI MD and got PO order for plavix and ASA, stopped cangrelor gtt per order when loaded with PO meds. Pt able to swallow one pill at a time with apple sauce. Pt needs lot of encouragment to eat, refusing dietary supplements, remains confused on/off. Neglecting Right side, at this time.  Family wanting to make pt DNAR, hospitalist aware, states to put paperwork in pt chart and will fill out with family in AM.       Problem: Patient/Family Goals  Goal: Patient/Family Long Term Goal  Description: Patient's Long Term Goal: Gain speech and mobility back    Interventions:  - Follow plan of care/outpt rehab as needed  - See additional Care Plan goals for specific interventions  Outcome: Progressing  Goal: Patient/Family Short Term Goal  Description: Patient's Short Term Goal: Be discharged    Interventions:   - Follow plan of care  - See additional Care Plan goals for specific interventions  Outcome: Progressing     Problem: Delirium  Goal: Minimize duration of delirium  Description: Interventions:  - Encourage use of hearing aids, eye glasses  - Promote highest level of mobility daily  - Provide frequent reorientation  - Promote wakefulness i.e. lights on, blinds open  - Promote sleep, encourage patient's normal rest cycle i.e. lights off, TV off, minimize noise and interruptions  - Encourage family to assist in orientation and promotion of home routines  Outcome: Progressing     Problem: NEUROLOGICAL - ADULT  Goal: Achieves stable or improved neurological status  Description: INTERVENTIONS  - Assess for and report changes in neurological status  - Initiate measures to prevent increased intracranial pressure  - Maintain blood pressure and fluid volume within ordered parameters to optimize cerebral perfusion and minimize risk of hemorrhage  - Monitor temperature, glucose, and sodium.  Initiate appropriate interventions as ordered  Outcome: Progressing  Goal: Absence of seizures  Description: INTERVENTIONS  - Monitor for seizure activity  - Administer anti-seizure medications as ordered  - Monitor neurological status  Outcome: Progressing  Goal: Remains free of injury related to seizure activity  Description: INTERVENTIONS:  - Maintain airway, patient safety  and administer oxygen as ordered  - Monitor patient for seizure activity, document and report duration and description of seizure to MD/LIP  - If seizure occurs, turn patient to side and suction secretions as needed  - Reorient patient post seizure  - Seizure pads on all 4 side rails  - Instruct patient/family to notify RN of any seizure activity  - Instruct patient/family to call for assistance with activity based on assessment  Outcome: Progressing  Goal: Achieves maximal functionality and self care  Description: INTERVENTIONS  - Monitor swallowing and airway patency with patient fatigue and changes in neurological status  - Encourage and assist patient to increase activity and self care with guidance from PT/OT  - Encourage visually impaired, hearing impaired and aphasic patients to use assistive/communication devices  Outcome: Progressing

## 2022-10-30 ENCOUNTER — APPOINTMENT (OUTPATIENT)
Dept: CT IMAGING | Facility: HOSPITAL | Age: 87
End: 2022-10-30
Attending: INTERNAL MEDICINE
Payer: MEDICARE

## 2022-10-30 LAB
ANION GAP SERPL CALC-SCNC: 5 MMOL/L (ref 0–18)
BUN BLD-MCNC: 19 MG/DL (ref 7–18)
CALCIUM BLD-MCNC: 8.6 MG/DL (ref 8.5–10.1)
CHLORIDE SERPL-SCNC: 104 MMOL/L (ref 98–112)
CO2 SERPL-SCNC: 31 MMOL/L (ref 21–32)
CREAT BLD-MCNC: 0.53 MG/DL
ERYTHROCYTE [DISTWIDTH] IN BLOOD BY AUTOMATED COUNT: 13.8 %
GFR SERPLBLD BASED ON 1.73 SQ M-ARVRAT: 99 ML/MIN/1.73M2 (ref 60–?)
GLUCOSE BLD-MCNC: 100 MG/DL (ref 70–99)
GLUCOSE BLD-MCNC: 101 MG/DL (ref 70–99)
GLUCOSE BLD-MCNC: 111 MG/DL (ref 70–99)
GLUCOSE BLD-MCNC: 191 MG/DL (ref 70–99)
HCT VFR BLD AUTO: 35.6 %
HGB BLD-MCNC: 12.2 G/DL
MCH RBC QN AUTO: 31.8 PG (ref 26–34)
MCHC RBC AUTO-ENTMCNC: 34.3 G/DL (ref 31–37)
MCV RBC AUTO: 92.7 FL
OSMOLALITY SERPL CALC.SUM OF ELEC: 293 MOSM/KG (ref 275–295)
PLATELET # BLD AUTO: 258 10(3)UL (ref 150–450)
POTASSIUM SERPL-SCNC: 2.9 MMOL/L (ref 3.5–5.1)
POTASSIUM SERPL-SCNC: 2.9 MMOL/L (ref 3.5–5.1)
POTASSIUM SERPL-SCNC: 3.4 MMOL/L (ref 3.5–5.1)
RBC # BLD AUTO: 3.84 X10(6)UL
SODIUM SERPL-SCNC: 140 MMOL/L (ref 136–145)
WBC # BLD AUTO: 10.4 X10(3) UL (ref 4–11)

## 2022-10-30 PROCEDURE — 70450 CT HEAD/BRAIN W/O DYE: CPT | Performed by: INTERNAL MEDICINE

## 2022-10-30 PROCEDURE — 99291 CRITICAL CARE FIRST HOUR: CPT | Performed by: INTERNAL MEDICINE

## 2022-10-30 PROCEDURE — 99292 CRITICAL CARE ADDL 30 MIN: CPT | Performed by: INTERNAL MEDICINE

## 2022-10-30 RX ORDER — POTASSIUM CHLORIDE 14.9 MG/ML
20 INJECTION INTRAVENOUS ONCE
Status: COMPLETED | OUTPATIENT
Start: 2022-10-30 | End: 2022-10-30

## 2022-10-30 RX ORDER — AMIODARONE HYDROCHLORIDE 200 MG/1
200 TABLET ORAL 3 TIMES DAILY
Status: DISCONTINUED | OUTPATIENT
Start: 2022-10-30 | End: 2022-11-01

## 2022-10-30 NOTE — PLAN OF CARE
Assumed care of pt. Around 0730. Pt. Remains resting in bed; follows commands on the left side and withdrawing to pain on the right side with minimal movement in right leg throughout shift; see neuro flowssheets for further details. Pt. Remains in AFIB but rate controlled and  PO amiodiorone started. Pt. Up in chair today via lift and tolerated well. Pt. With transfer orders but no bed available at this time. Sons and wife at bedside updated on plan of care.

## 2022-10-30 NOTE — CM/SW NOTE
Chart reviewed. PMR recommending acute rehab. SW sent acute rehab referrals in [place]. Will need follow up and insurance authorization at hospital discharge.      MARY JO Mooney, [facility]  Discharge [address] [facility].

## 2022-10-30 NOTE — PLAN OF CARE
Assumed care of pt @ 1930. Rec'd pt in bed, resting. Pt. Follows commands to left side, withdraws to pain on right side. Tele=AFIB, rate 80's, weaning Cardizem drip as tolerated.

## 2022-10-30 NOTE — PLAN OF CARE
Stroke binder given to patient's son. The following education was provided:    BEFAST- Stroke warning signs and symptoms. How to activate EMS for stroke. Personalized risk factors including carotid stenosis, HL, Afib. Healthy lifestyle (nutrition and exercise). Need for follow up after discharge. Patient unable to participate in stroke education. Patient's son receptive to teaching. All pertinent questions and concerns were addressed.

## 2022-10-31 ENCOUNTER — APPOINTMENT (OUTPATIENT)
Dept: ULTRASOUND IMAGING | Facility: HOSPITAL | Age: 87
End: 2022-10-31
Payer: MEDICARE

## 2022-10-31 LAB
GLUCOSE BLD-MCNC: 114 MG/DL (ref 70–99)
GLUCOSE BLD-MCNC: 128 MG/DL (ref 70–99)
GLUCOSE BLD-MCNC: 92 MG/DL (ref 70–99)
GLUCOSE BLD-MCNC: 96 MG/DL (ref 70–99)
POTASSIUM SERPL-SCNC: 3.7 MMOL/L (ref 3.5–5.1)

## 2022-10-31 PROCEDURE — 93880 EXTRACRANIAL BILAT STUDY: CPT

## 2022-10-31 PROCEDURE — 99232 SBSQ HOSP IP/OBS MODERATE 35: CPT | Performed by: NURSE PRACTITIONER

## 2022-10-31 RX ORDER — POTASSIUM CHLORIDE 14.9 MG/ML
20 INJECTION INTRAVENOUS ONCE
Status: COMPLETED | OUTPATIENT
Start: 2022-10-31 | End: 2022-10-31

## 2022-10-31 NOTE — CM/SW NOTE
SHRAVAN met with pt, son, Addison Gordon and wife at bedside. Discussed dc planning and recommendations. Made aware and chose [facility]. Dicussed post AR plans--pt has LTC insurance. SW starting to bring up a dc home, on which wife and son stating pt has started to need more assistance at home. Family looking into [facility] for long term care. Will likely go to [facility] at [facility] first then transition to LTC. All questions answered, SW to notify [facility] of choice and to start auth. Addendum: SHRAVAN notified by liaison Dr. Kiara Valero saw pt in person and is now recommending [facility]. SW to follow up to discuss plan. Addendum 3:30- SHRAVAN met with pt, wife and 2 sons at bedside. Reviewed above. Aware that [facility] TEX is OON-- family can still pursue under LTC if they wanted. TEX referrals pending, answered questions. SW to meet with pt/family again tomorrow afternoon on available SARs. Insurance auth needed prior to dc.      Pascual Alicia, STEPHEN  Discharge [address] [facility]

## 2022-10-31 NOTE — CM/SW NOTE
Pt discussed in rounds, PT, PMR agreeable to AR. SW notified son, Ryan Apodaca had questions. SW left VM for son to address. SW to meet with pt/family when time allows to discuss recommendations. Pt will need insurance auth prior to dc.      Haseeb Marin, LSW  Discharge 2011 [facility]

## 2022-10-31 NOTE — PLAN OF CARE
Assumed care of patient at approximately 1930. Resting in bed. Neuro exam unchanged from previous;see flowsheet for details. Amor placed for retention per Dr. Micah Yoon orders. Afib on telemetry; remained rate controlled. Patient to transfer to [place] [id number]. Report called to [facility].

## 2022-11-01 LAB
GLUCOSE BLD-MCNC: 106 MG/DL (ref 70–99)
GLUCOSE BLD-MCNC: 135 MG/DL (ref 70–99)
GLUCOSE BLD-MCNC: 185 MG/DL (ref 70–99)
GLUCOSE BLD-MCNC: 78 MG/DL (ref 70–99)
POTASSIUM SERPL-SCNC: 4.1 MMOL/L (ref 3.5–5.1)

## 2022-11-01 PROCEDURE — 99232 SBSQ HOSP IP/OBS MODERATE 35: CPT

## 2022-11-01 RX ORDER — ATORVASTATIN CALCIUM 40 MG/1
40 TABLET, FILM COATED ORAL NIGHTLY
Qty: 30 TABLET | Refills: 2 | Status: SHIPPED | OUTPATIENT
Start: 2022-11-01

## 2022-11-01 RX ORDER — ASPIRIN 81 MG/1
81 TABLET, CHEWABLE ORAL DAILY
Qty: 30 TABLET | Refills: 2 | Status: SHIPPED | OUTPATIENT
Start: 2022-11-02

## 2022-11-01 RX ORDER — AMIODARONE HYDROCHLORIDE 200 MG/1
200 TABLET ORAL 2 TIMES DAILY WITH MEALS
Status: DISCONTINUED | OUTPATIENT
Start: 2022-11-01 | End: 2022-11-03

## 2022-11-01 NOTE — PLAN OF CARE
Assumed patient care 0730  Patient alert and oriented X2-3, confused  On 2L NC, VSS, afib on tele  Q4 neuro, no new deficits  Denies pain  Total assist to turn side to side in bed  Voiding clear yellow urine per benito catheter, no BM this shift  Tolerating diet, pills whole with applesauce  Carotid doppler US complete, see results   Patient and family updated on plan of care     Problem: NEUROLOGICAL - ADULT  Goal: Achieves stable or improved neurological status  Description: INTERVENTIONS  - Assess for and report changes in neurological status  - Initiate measures to prevent increased intracranial pressure  - Maintain blood pressure and fluid volume within ordered parameters to optimize cerebral perfusion and minimize risk of hemorrhage  - Monitor temperature, glucose, and sodium.  Initiate appropriate interventions as ordered  Outcome: Progressing  Goal: Absence of seizures  Description: INTERVENTIONS  - Monitor for seizure activity  - Administer anti-seizure medications as ordered  - Monitor neurological status  Outcome: Progressing  Goal: Remains free of injury related to seizure activity  Description: INTERVENTIONS:  - Maintain airway, patient safety  and administer oxygen as ordered  - Monitor patient for seizure activity, document and report duration and description of seizure to MD/LIP  - If seizure occurs, turn patient to side and suction secretions as needed  - Reorient patient post seizure  - Seizure pads on all 4 side rails  - Instruct patient/family to notify RN of any seizure activity  - Instruct patient/family to call for assistance with activity based on assessment  Outcome: Progressing  Goal: Achieves maximal functionality and self care  Description: INTERVENTIONS  - Monitor swallowing and airway patency with patient fatigue and changes in neurological status  - Encourage and assist patient to increase activity and self care with guidance from PT/OT  - Encourage visually impaired, hearing impaired and aphasic patients to use assistive/communication devices  Outcome: Progressing     Problem: Delirium  Goal: Minimize duration of delirium  Description: Interventions:  - Encourage use of hearing aids, eye glasses  - Promote highest level of mobility daily  - Provide frequent reorientation  - Promote wakefulness i.e. lights on, blinds open  - Promote sleep, encourage patient's normal rest cycle i.e. lights off, TV off, minimize noise and interruptions  - Encourage family to assist in orientation and promotion of home routines  Outcome: Progressing

## 2022-11-01 NOTE — SLP NOTE
SPEECH DAILY NOTE - INPATIENT    ASSESSMENT & PLAN   ASSESSMENT  Pt seen for dysphagia tx to assess tolerance with recommended diet, ensure proper utilization of aspiration precautions and provide pt/family education. Patient seen with his wife present. Patient sitting upright in the bed. He was seen with recommended diet with patient feeding self required intermittent cues for utilization of small bites and sips, alternating consistencies, and slow rate. Patient with continued improvement in demonstrating independence in utilization of strategies, patient at minimal independence with utilization of compensatory strategies. Patient without any overt clinical s/s of aspiration on this date. Continue skilled dysphagia therapy targeting facilitation of utilization of strategies. Recommend Cogniitve-communicative assessment per stroke protocol. Diet Recommendations - Solids: Mechanical soft chopped/ Soft & Bite Sized  Diet Recommendations - Liquids: Thin Liquids    Compensatory Strategies Recommended: Small bites and sips; Alternate consistencies; Slow rate  Aspiration Precautions: Upright position; Slow rate;Small bites and sips  Medication Administration Recommendations: Whole in puree    Patient Experiencing Pain:  (No pain behaviors observed or reported by pt.)    Discharge Recommendations  Discharge Recommendations/Plan: Undetermined    Treatment Plan  Treatment Plan/Recommendations: Dysphagia therapy; Aspiration precautions    Interdisciplinary Communication: Discussed with RN  Recommendations posted at bedside             GOALS  Goal #1 The patient will tolerate soft and bite sized consistency and thin liquids without overt signs or symptoms of aspiration with 100 % accuracy over 1-2 session(s).  In progress   Goal #2 The patient will utilize compensatory strategies as outlined by  BSSE (clinical evaluation) including Slow rate, Small bites, Small sips, No straws, Upright 90 degrees, Eliminate distractions, Supervision with meals with as needed assistance 95 % of the time across 2 sessions. In progress   Goal #3 The patient/family/caregiver will demonstrate understanding and implementation of aspiration precautions and swallow strategies independently over 1-2 session(s). In Progress   Goal #4 Pt will participate in a communicative-cogntive evaluation per stroke protocol. Not addressed         FOLLOW UP  Follow Up Needed (Documentation Required): Yes  SLP Follow-up Date: 11/02/22  Number of Visits to Meet Established Goals: 2    Session: 2 of 2 (continue to follow for swallowing therapy)   Communicative-cognitive assessment to be completed per protocol.        If you have any questions, please contact Tran Sharma, SLP

## 2022-11-01 NOTE — CM/SW NOTE
SW met with pt/family. Discussed available SARs. Those requested per conversation are [facility]. Options for [facility] limited based on location-- wife is elderly and wants somewhere close where she can visit daily. Family looking at [facility]. SW to send message to [place] liaison to see if [facility] able to accept. [facility] showing as \"no bed\" SW asked on when bed will become available. Family aware pt can only stay in hosp for so long while waiting for a bed. SW used example of 1-2 days waiting for bed rather than 1 week. Family understood. SW to meet with spouse/call son tomorrow AM and then follow up again with kids in the afternoon on dc plans.      STEPHEN Dobbins  Discharge 2011 [facility]

## 2022-11-01 NOTE — PLAN OF CARE
ASSUMED care of pt who is A & O x3. NSR on tele. Neuros remain stable. Pt slept intemittently throughout the day. Ate well today. Family at bedside.

## 2022-11-01 NOTE — PLAN OF CARE
Assumed care at 1900.  A&O x 3. Tele-NSR  Pt had BM  Neuro check remains stable, continue Q 4  Pt repositioned but often declines  Pt unable to sleep, melatonin declined  Will continue to monitor

## 2022-11-02 LAB
GLUCOSE BLD-MCNC: 142 MG/DL (ref 70–99)
GLUCOSE BLD-MCNC: 90 MG/DL (ref 70–99)
GLUCOSE BLD-MCNC: 94 MG/DL (ref 70–99)
GLUCOSE BLD-MCNC: 98 MG/DL (ref 70–99)
ISTAT ACTIVATED CLOTTING TIME: 115 SECONDS (ref 74–137)
ISTAT ACTIVATED CLOTTING TIME: 179 SECONDS (ref 74–137)
ISTAT ACTIVATED CLOTTING TIME: 208 SECONDS (ref 74–137)
ISTAT ACTIVATED CLOTTING TIME: 208 SECONDS (ref 74–137)

## 2022-11-02 NOTE — PLAN OF CARE
Assumed care of pt 0730  NIH daily, Q4 neuro  Vitals stable throughout  No acute neurological changes  Pt A&Ox2, reoriented with speech  Pt up to chair, cleaned  Family updated on plan of care

## 2022-11-02 NOTE — CM/SW NOTE
Referral checked this AM-- no response yet from [facility] or [employer]. SW extended deadline to provide more time for providers to view. SW following. [facility], 's does not have a bed. SHRAVAN met with pt, wife. Pt awake, alert in chair and communicating through conversation. Son, Rosaura Poe called. Family not impressed on [place]. States they have called [employer] and his PPO will cover half of TEX stay. Need to figure out how long stay will be. Family to discuss-- currently [facility] vs [employer]. Will need insurance auth. SHRAVAN to meet with family again around 4pm.     Addendum: SHRAVAN met with pt/family at bedside. Pt awake and engaged throughout the conversation. Decided on [employer] for [employer]. Informed building, will start insurance auth.        Samara Hernandez, STEPHEN  Discharge 2011 [facility]

## 2022-11-02 NOTE — PLAN OF CARE
Assumed care at 1900. A & O x 2-3; confused. Neuro checks q 4hr. Daily NIH. No new neuro deficits noted. See flowsheets. NSR/ ST on tele. Room air. Meds crushed in room temperature applesauce. Pt experienced runs of SVT throughout the night. Showing no symptoms. Hospitalist notified and aware. Will continue to monitor.

## 2022-11-03 LAB
GLUCOSE BLD-MCNC: 122 MG/DL (ref 70–99)
GLUCOSE BLD-MCNC: 94 MG/DL (ref 70–99)
GLUCOSE BLD-MCNC: 97 MG/DL (ref 70–99)
GLUCOSE BLD-MCNC: 97 MG/DL (ref 70–99)

## 2022-11-03 PROCEDURE — 99232 SBSQ HOSP IP/OBS MODERATE 35: CPT

## 2022-11-03 RX ORDER — AMIODARONE HYDROCHLORIDE 200 MG/1
200 TABLET ORAL DAILY
Status: DISCONTINUED | OUTPATIENT
Start: 2022-11-04 | End: 2022-11-10

## 2022-11-03 NOTE — PLAN OF CARE
Assumed care at 1900. A & O x 2-3; confused. Neuro checks q 4. Daily NIH. No new deficits noted. See flowsheets. NSR on tele. Room air. Meds crushed in room temp applesauce  Will continue to monitor.

## 2022-11-03 NOTE — PLAN OF CARE
Assumed care of patient at 0700  Neuros Qshift, no new deficits  Eliquis on hold for carotid stenting on Monday. Lovenox AM dose on Monday held in anticipation of procedure. Denies pain  Patient worked with SLP and PT, tolerated well. Diet advanced to regular/thin  Patient and wife agreeable to patient receiving COVID booster prior to discharge to rehab  Awaiting insurance auth for rehab  Plan for stenting on Monday and DAPT for 1-3 months  Patient currently resting in bed, call light within reach        Problem: Patient/Family Goals  Goal: Patient/Family Long Term Goal  Description: Patient's Long Term Goal: Gain speech and mobility back    Interventions:  - Follow plan of care/outpt rehab as needed  - See additional Care Plan goals for specific interventions  Outcome: Progressing  Goal: Patient/Family Short Term Goal  Description: Patient's Short Term Goal: Be discharged    Interventions:   - Follow plan of care  - See additional Care Plan goals for specific interventions  Outcome: Progressing     Problem: Delirium  Goal: Minimize duration of delirium  Description: Interventions:  - Encourage use of hearing aids, eye glasses  - Promote highest level of mobility daily  - Provide frequent reorientation  - Promote wakefulness i.e. lights on, blinds open  - Promote sleep, encourage patient's normal rest cycle i.e. lights off, TV off, minimize noise and interruptions  - Encourage family to assist in orientation and promotion of home routines  Outcome: Progressing     Problem: NEUROLOGICAL - ADULT  Goal: Achieves stable or improved neurological status  Description: INTERVENTIONS  - Assess for and report changes in neurological status  - Initiate measures to prevent increased intracranial pressure  - Maintain blood pressure and fluid volume within ordered parameters to optimize cerebral perfusion and minimize risk of hemorrhage  - Monitor temperature, glucose, and sodium.  Initiate appropriate interventions as ordered  Outcome: Progressing  Goal: Absence of seizures  Description: INTERVENTIONS  - Monitor for seizure activity  - Administer anti-seizure medications as ordered  - Monitor neurological status  Outcome: Progressing  Goal: Remains free of injury related to seizure activity  Description: INTERVENTIONS:  - Maintain airway, patient safety  and administer oxygen as ordered  - Monitor patient for seizure activity, document and report duration and description of seizure to MD/LIP  - If seizure occurs, turn patient to side and suction secretions as needed  - Reorient patient post seizure  - Seizure pads on all 4 side rails  - Instruct patient/family to notify RN of any seizure activity  - Instruct patient/family to call for assistance with activity based on assessment  Outcome: Progressing  Goal: Achieves maximal functionality and self care  Description: INTERVENTIONS  - Monitor swallowing and airway patency with patient fatigue and changes in neurological status  - Encourage and assist patient to increase activity and self care with guidance from PT/OT  - Encourage visually impaired, hearing impaired and aphasic patients to use assistive/communication devices  Outcome: Progressing

## 2022-11-03 NOTE — CM/SW NOTE
Pt discussed in rounds, plans for stent on Monday with neuro IR. Insurance auth pending. Mikayla King asking if pt can receive booster prior to dc. Otherwise pt will have to isolate for 10 days. RN, MD notified. SW to remain available for dc planning and recommendations. 11:30- SHRAVAN met with wife and provided her POLST form. She and her children discussed for DNAR/Comfort. Wife will sign when children are present.      Merlin Hong, STEPHEN  Discharge 2011 [facility]

## 2022-11-04 LAB
GLUCOSE BLD-MCNC: 144 MG/DL (ref 70–99)
GLUCOSE BLD-MCNC: 82 MG/DL (ref 70–99)
GLUCOSE BLD-MCNC: 86 MG/DL (ref 70–99)
GLUCOSE BLD-MCNC: 97 MG/DL (ref 70–99)

## 2022-11-04 PROCEDURE — 99232 SBSQ HOSP IP/OBS MODERATE 35: CPT

## 2022-11-04 RX ORDER — CLOPIDOGREL BISULFATE 75 MG/1
600 TABLET ORAL ONCE
Status: COMPLETED | OUTPATIENT
Start: 2022-11-04 | End: 2022-11-04

## 2022-11-04 RX ORDER — ASPIRIN 325 MG
325 TABLET, DELAYED RELEASE (ENTERIC COATED) ORAL DAILY
Status: DISCONTINUED | OUTPATIENT
Start: 2022-11-05 | End: 2022-11-05

## 2022-11-04 RX ORDER — CLOPIDOGREL BISULFATE 75 MG/1
75 TABLET ORAL DAILY
Status: DISCONTINUED | OUTPATIENT
Start: 2022-11-05 | End: 2022-11-09

## 2022-11-04 NOTE — PLAN OF CARE
Received care @ 1930  Pt A&OX3; neuro status unchanged  RA, NS on tele  Amor w/ adequate output  BP within parameters  Denies pain     Updated on POC

## 2022-11-04 NOTE — PLAN OF CARE
Assumed care at 0730. A&OX3. Language barrier, but able to communicate well. Family at bedside to assist in communication. Tele - SR. Neuro assessment remains unchanged - see flowsheet. Eliquis remains on hold. Amor draining clear yellow urine. Poor appetite - family encouraging pt to eat. Up in chair with sit-to-stand lift. Rt shoulder braced when transferring. POLST form signed by wife today. Pt given loading dose of Plavix today. Plan for Surgery Monday afternoon. Resting comfortably. Will continue to monitor. Problem: Patient/Family Goals  Goal: Patient/Family Long Term Goal  Description: Patient's Long Term Goal: Gain speech and mobility back    Interventions:  - Follow plan of care/outpt rehab as needed  - See additional Care Plan goals for specific interventions  Outcome: Progressing  Goal: Patient/Family Short Term Goal  Description: Patient's Short Term Goal: Be discharged    Interventions:   - Follow plan of care  - See additional Care Plan goals for specific interventions  Outcome: Progressing     Problem: Delirium  Goal: Minimize duration of delirium  Description: Interventions:  - Encourage use of hearing aids, eye glasses  - Promote highest level of mobility daily  - Provide frequent reorientation  - Promote wakefulness i.e. lights on, blinds open  - Promote sleep, encourage patient's normal rest cycle i.e. lights off, TV off, minimize noise and interruptions  - Encourage family to assist in orientation and promotion of home routines  Outcome: Progressing     Problem: NEUROLOGICAL - ADULT  Goal: Achieves stable or improved neurological status  Description: INTERVENTIONS  - Assess for and report changes in neurological status  - Initiate measures to prevent increased intracranial pressure  - Maintain blood pressure and fluid volume within ordered parameters to optimize cerebral perfusion and minimize risk of hemorrhage  - Monitor temperature, glucose, and sodium.  Initiate appropriate interventions as ordered  Outcome: Progressing  Goal: Absence of seizures  Description: INTERVENTIONS  - Monitor for seizure activity  - Administer anti-seizure medications as ordered  - Monitor neurological status  Outcome: Progressing  Goal: Remains free of injury related to seizure activity  Description: INTERVENTIONS:  - Maintain airway, patient safety  and administer oxygen as ordered  - Monitor patient for seizure activity, document and report duration and description of seizure to MD/LIP  - If seizure occurs, turn patient to side and suction secretions as needed  - Reorient patient post seizure  - Seizure pads on all 4 side rails  - Instruct patient/family to notify RN of any seizure activity  - Instruct patient/family to call for assistance with activity based on assessment  Outcome: Progressing  Goal: Achieves maximal functionality and self care  Description: INTERVENTIONS  - Monitor swallowing and airway patency with patient fatigue and changes in neurological status  - Encourage and assist patient to increase activity and self care with guidance from PT/OT  - Encourage visually impaired, hearing impaired and aphasic patients to use assistive/communication devices  Outcome: Progressing

## 2022-11-05 ENCOUNTER — APPOINTMENT (OUTPATIENT)
Dept: CT IMAGING | Facility: HOSPITAL | Age: 87
End: 2022-11-05
Payer: MEDICARE

## 2022-11-05 LAB
ANION GAP SERPL CALC-SCNC: 5 MMOL/L (ref 0–18)
ASPIRIN PLT INHIBITION: 375 ARU (ref 620–672)
BUN BLD-MCNC: 22 MG/DL (ref 7–18)
CALCIUM BLD-MCNC: 8.5 MG/DL (ref 8.5–10.1)
CHLORIDE SERPL-SCNC: 93 MMOL/L (ref 98–112)
CO2 SERPL-SCNC: 30 MMOL/L (ref 21–32)
CREAT BLD-MCNC: 0.76 MG/DL
GFR SERPLBLD BASED ON 1.73 SQ M-ARVRAT: 89 ML/MIN/1.73M2 (ref 60–?)
GLUCOSE BLD-MCNC: 100 MG/DL (ref 70–99)
GLUCOSE BLD-MCNC: 100 MG/DL (ref 70–99)
GLUCOSE BLD-MCNC: 120 MG/DL (ref 70–99)
GLUCOSE BLD-MCNC: 156 MG/DL (ref 70–99)
GLUCOSE BLD-MCNC: 99 MG/DL (ref 70–99)
OSMOLALITY SERPL CALC.SUM OF ELEC: 273 MOSM/KG (ref 275–295)
P2Y12 REACTION UNITS: 28 PRU
POTASSIUM SERPL-SCNC: 4.2 MMOL/L (ref 3.5–5.1)
SODIUM SERPL-SCNC: 128 MMOL/L (ref 136–145)

## 2022-11-05 PROCEDURE — 70496 CT ANGIOGRAPHY HEAD: CPT

## 2022-11-05 PROCEDURE — 70498 CT ANGIOGRAPHY NECK: CPT

## 2022-11-05 RX ORDER — IOHEXOL 350 MG/ML
75 INJECTION, SOLUTION INTRAVENOUS
Status: COMPLETED | OUTPATIENT
Start: 2022-11-05 | End: 2022-11-05

## 2022-11-05 RX ORDER — ASPIRIN 81 MG/1
81 TABLET ORAL DAILY
Status: DISCONTINUED | OUTPATIENT
Start: 2022-11-06 | End: 2022-11-10

## 2022-11-05 RX ORDER — IOHEXOL 350 MG/ML
100 INJECTION, SOLUTION INTRAVENOUS
Status: COMPLETED | OUTPATIENT
Start: 2022-11-05 | End: 2022-11-05

## 2022-11-05 NOTE — PLAN OF CARE
Assumed care at 0730. A&OX3. Tele - SR. Amor draining Clear yellow urine. Neuro status unchanged - see flow sheet. Up in chair today. PO intake fair. Plan for surgery on Monday. CTA of Head & Neck today. Family at bedside. Resting comfortably. Will continue to monitor.

## 2022-11-05 NOTE — PLAN OF CARE
Received patient awake and oriented with family at bedside. On room air, breath sounds diminished. On tele, SR. Amor catheter draining yellow urine. No c/o pain voiced. Neuro vitals remain unchanged.

## 2022-11-06 LAB
GLUCOSE BLD-MCNC: 100 MG/DL (ref 70–99)
GLUCOSE BLD-MCNC: 112 MG/DL (ref 70–99)
GLUCOSE BLD-MCNC: 123 MG/DL (ref 70–99)
GLUCOSE BLD-MCNC: 95 MG/DL (ref 70–99)
SARS-COV-2 RNA RESP QL NAA+PROBE: NOT DETECTED

## 2022-11-06 NOTE — PLAN OF CARE
Assumed care at 0730. A&OX2-3. Tele - SR. Neuro deficits unchanged. See flow sheet. Amor draining clear yellow urine. Pt taking in minimal amount of food. Up in chair this am.  Family at bedside. Plan for Surgery in am.  Consent signed by family. Resting comfortably. Will continue to monitor.

## 2022-11-06 NOTE — PLAN OF CARE
Assumed care at [address] [place] [address]. Alert and oriented x4. Telemetry monitor reading SR. No pain. No acute events overnight. Fall precautions maintained per protocol. Plan for surgery on Monday. Call light in reach at bedside. Will continue to monitor.       Problem: Patient/Family Goals  Goal: Patient/Family Long Term Goal  Description: Patient's Long Term Goal: Gain speech and mobility back    Interventions:  - Follow plan of care/outpt rehab as needed  - See additional Care Plan goals for specific interventions  Outcome: Progressing  Goal: Patient/Family Short Term Goal  Description: Patient's Short Term Goal: Be discharged    Interventions:   - Follow plan of care  - See additional Care Plan goals for specific interventions  Outcome: Progressing

## 2022-11-07 LAB
ALBUMIN SERPL-MCNC: 2.3 G/DL (ref 3.4–5)
ALBUMIN/GLOB SERPL: 0.6 {RATIO} (ref 1–2)
ALP LIVER SERPL-CCNC: 129 U/L
ALT SERPL-CCNC: 14 U/L
ANION GAP SERPL CALC-SCNC: 11 MMOL/L (ref 0–18)
AST SERPL-CCNC: 22 U/L (ref 15–37)
BASOPHILS # BLD AUTO: 0.01 X10(3) UL (ref 0–0.2)
BASOPHILS NFR BLD AUTO: 0.1 %
BILIRUB SERPL-MCNC: 0.6 MG/DL (ref 0.1–2)
BUN BLD-MCNC: 23 MG/DL (ref 7–18)
CALCIUM BLD-MCNC: 8.9 MG/DL (ref 8.5–10.1)
CHLORIDE SERPL-SCNC: 97 MMOL/L (ref 98–112)
CO2 SERPL-SCNC: 25 MMOL/L (ref 21–32)
CREAT BLD-MCNC: 0.5 MG/DL
EOSINOPHIL # BLD AUTO: 0.04 X10(3) UL (ref 0–0.7)
EOSINOPHIL NFR BLD AUTO: 0.4 %
ERYTHROCYTE [DISTWIDTH] IN BLOOD BY AUTOMATED COUNT: 13.5 %
GFR SERPLBLD BASED ON 1.73 SQ M-ARVRAT: 100 ML/MIN/1.73M2 (ref 60–?)
GLOBULIN PLAS-MCNC: 4 G/DL (ref 2.8–4.4)
GLUCOSE BLD-MCNC: 108 MG/DL (ref 70–99)
GLUCOSE BLD-MCNC: 113 MG/DL (ref 70–99)
GLUCOSE BLD-MCNC: 166 MG/DL (ref 70–99)
GLUCOSE BLD-MCNC: 91 MG/DL (ref 70–99)
GLUCOSE BLD-MCNC: 97 MG/DL (ref 70–99)
HCT VFR BLD AUTO: 40.2 %
HGB BLD-MCNC: 13.8 G/DL
IMM GRANULOCYTES # BLD AUTO: 0.06 X10(3) UL (ref 0–1)
IMM GRANULOCYTES NFR BLD: 0.6 %
INR BLD: 1.1 (ref 0.85–1.16)
LYMPHOCYTES # BLD AUTO: 0.79 X10(3) UL (ref 1–4)
LYMPHOCYTES NFR BLD AUTO: 7.7 %
MCH RBC QN AUTO: 31.4 PG (ref 26–34)
MCHC RBC AUTO-ENTMCNC: 34.3 G/DL (ref 31–37)
MCV RBC AUTO: 91.6 FL
MONOCYTES # BLD AUTO: 1.07 X10(3) UL (ref 0.1–1)
MONOCYTES NFR BLD AUTO: 10.5 %
NEUTROPHILS # BLD AUTO: 8.26 X10 (3) UL (ref 1.5–7.7)
NEUTROPHILS # BLD AUTO: 8.26 X10(3) UL (ref 1.5–7.7)
NEUTROPHILS NFR BLD AUTO: 80.7 %
OSMOLALITY SERPL CALC.SUM OF ELEC: 279 MOSM/KG (ref 275–295)
PLATELET # BLD AUTO: 423 10(3)UL (ref 150–450)
POTASSIUM SERPL-SCNC: 4.7 MMOL/L (ref 3.5–5.1)
PROT SERPL-MCNC: 6.3 G/DL (ref 6.4–8.2)
PROTHROMBIN TIME: 14.2 SECONDS (ref 11.6–14.8)
RBC # BLD AUTO: 4.39 X10(6)UL
SODIUM SERPL-SCNC: 133 MMOL/L (ref 136–145)
WBC # BLD AUTO: 10.2 X10(3) UL (ref 4–11)

## 2022-11-07 PROCEDURE — 99232 SBSQ HOSP IP/OBS MODERATE 35: CPT

## 2022-11-07 NOTE — OCCUPATIONAL THERAPY NOTE
Attempted to see pt for skilled OT services this date. Pt is scheduled forcarotid revascularization of symptomatic LICA origin stenosis by endovascular stenting on today 11/7. Will re-attempt as schedule allows.  Delon Germain, OT, 11/07/22, 11:03 AM

## 2022-11-08 ENCOUNTER — APPOINTMENT (OUTPATIENT)
Dept: INTERVENTIONAL RADIOLOGY/VASCULAR | Facility: HOSPITAL | Age: 87
End: 2022-11-08
Payer: MEDICARE

## 2022-11-08 ENCOUNTER — APPOINTMENT (OUTPATIENT)
Dept: ULTRASOUND IMAGING | Facility: HOSPITAL | Age: 87
End: 2022-11-08
Attending: INTERNAL MEDICINE
Payer: MEDICARE

## 2022-11-08 LAB
GLUCOSE BLD-MCNC: 102 MG/DL (ref 70–99)
GLUCOSE BLD-MCNC: 113 MG/DL (ref 70–99)
GLUCOSE BLD-MCNC: 119 MG/DL (ref 70–99)

## 2022-11-08 PROCEDURE — 037L34Z DILATION OF LEFT INTERNAL CAROTID ARTERY WITH DRUG-ELUTING INTRALUMINAL DEVICE, PERCUTANEOUS APPROACH: ICD-10-PCS

## 2022-11-08 PROCEDURE — 3074F SYST BP LT 130 MM HG: CPT

## 2022-11-08 PROCEDURE — B31RYZZ FLUOROSCOPY OF INTRACRANIAL ARTERIES USING OTHER CONTRAST: ICD-10-PCS

## 2022-11-08 PROCEDURE — 76942 ECHO GUIDE FOR BIOPSY: CPT | Performed by: ANESTHESIOLOGY

## 2022-11-08 PROCEDURE — 93971 EXTREMITY STUDY: CPT | Performed by: INTERNAL MEDICINE

## 2022-11-08 PROCEDURE — 99232 SBSQ HOSP IP/OBS MODERATE 35: CPT

## 2022-11-08 PROCEDURE — 3078F DIAST BP <80 MM HG: CPT

## 2022-11-08 PROCEDURE — X2AJ336 CEREBRAL EMBOLIC FILTRATION, EXTRACORPOREAL FLOW REVERSAL CIRCUIT FROM LEFT COMMON CAROTID ARTERY, PERCUTANEOUS APPROACH, NEW TECHNOLOGY GROUP 6: ICD-10-PCS

## 2022-11-08 PROCEDURE — B41FYZZ FLUOROSCOPY OF RIGHT LOWER EXTREMITY ARTERIES USING OTHER CONTRAST: ICD-10-PCS

## 2022-11-08 PROCEDURE — 99291 CRITICAL CARE FIRST HOUR: CPT | Performed by: NURSE PRACTITIONER

## 2022-11-08 RX ORDER — ONDANSETRON 2 MG/ML
4 INJECTION INTRAMUSCULAR; INTRAVENOUS EVERY 6 HOURS PRN
Status: DISCONTINUED | OUTPATIENT
Start: 2022-11-08 | End: 2022-11-10

## 2022-11-08 RX ORDER — LABETALOL HYDROCHLORIDE 5 MG/ML
10 INJECTION, SOLUTION INTRAVENOUS EVERY 10 MIN PRN
Status: DISCONTINUED | OUTPATIENT
Start: 2022-11-08 | End: 2022-11-10

## 2022-11-08 RX ORDER — MORPHINE SULFATE 2 MG/ML
2 INJECTION, SOLUTION INTRAMUSCULAR; INTRAVENOUS EVERY 2 HOUR PRN
Status: DISCONTINUED | OUTPATIENT
Start: 2022-11-08 | End: 2022-11-10

## 2022-11-08 RX ORDER — DEXMEDETOMIDINE HYDROCHLORIDE 4 UG/ML
INJECTION, SOLUTION INTRAVENOUS CONTINUOUS PRN
Status: DISCONTINUED | OUTPATIENT
Start: 2022-11-08 | End: 2022-11-08 | Stop reason: SURG

## 2022-11-08 RX ORDER — HEPARIN SODIUM 5000 [USP'U]/ML
INJECTION, SOLUTION INTRAVENOUS; SUBCUTANEOUS
Status: COMPLETED
Start: 2022-11-08 | End: 2022-11-08

## 2022-11-08 RX ORDER — MORPHINE SULFATE 2 MG/ML
1 INJECTION, SOLUTION INTRAMUSCULAR; INTRAVENOUS EVERY 2 HOUR PRN
Status: DISCONTINUED | OUTPATIENT
Start: 2022-11-08 | End: 2022-11-10

## 2022-11-08 RX ORDER — LIDOCAINE HYDROCHLORIDE 10 MG/ML
INJECTION, SOLUTION INFILTRATION; PERINEURAL
Status: COMPLETED
Start: 2022-11-08 | End: 2022-11-08

## 2022-11-08 RX ORDER — CEFAZOLIN SODIUM 1 G/3ML
INJECTION, POWDER, FOR SOLUTION INTRAMUSCULAR; INTRAVENOUS AS NEEDED
Status: DISCONTINUED | OUTPATIENT
Start: 2022-11-08 | End: 2022-11-08 | Stop reason: SURG

## 2022-11-08 RX ORDER — HEPARIN SODIUM 5000 [USP'U]/ML
INJECTION, SOLUTION INTRAVENOUS; SUBCUTANEOUS AS NEEDED
Status: DISCONTINUED | OUTPATIENT
Start: 2022-11-08 | End: 2022-11-08 | Stop reason: SURG

## 2022-11-08 RX ORDER — IODIXANOL 320 MG/ML
200 INJECTION, SOLUTION INTRAVASCULAR
Status: COMPLETED | OUTPATIENT
Start: 2022-11-08 | End: 2022-11-08

## 2022-11-08 RX ADMIN — HEPARIN SODIUM 1000 UNITS: 5000 INJECTION, SOLUTION INTRAVENOUS; SUBCUTANEOUS at 18:27:00

## 2022-11-08 RX ADMIN — SODIUM CHLORIDE: 9 INJECTION, SOLUTION INTRAVENOUS at 20:09:00

## 2022-11-08 RX ADMIN — CEFAZOLIN SODIUM 2 G: 1 INJECTION, POWDER, FOR SOLUTION INTRAMUSCULAR; INTRAVENOUS at 17:10:00

## 2022-11-08 RX ADMIN — DEXMEDETOMIDINE HYDROCHLORIDE 0.5 MCG/KG/HR: 4 INJECTION, SOLUTION INTRAVENOUS at 17:54:00

## 2022-11-08 RX ADMIN — HEPARIN SODIUM 2000 UNITS: 5000 INJECTION, SOLUTION INTRAVENOUS; SUBCUTANEOUS at 17:43:00

## 2022-11-08 RX ADMIN — DEXMEDETOMIDINE HYDROCHLORIDE 0.5 MCG/KG/HR: 4 INJECTION, SOLUTION INTRAVENOUS at 17:40:00

## 2022-11-08 RX ADMIN — DEXMEDETOMIDINE HYDROCHLORIDE 1 MCG/KG/HR: 4 INJECTION, SOLUTION INTRAVENOUS at 17:09:00

## 2022-11-08 NOTE — PLAN OF CARE
Alert and oriented x3. VSS. Patient c/o pain to right arm, orders placed for us. Arm swollen and elevated on pillows. Orders placed for RD for poor appetite. Family here and updated. Angiogram rescheduled for tomorrow.      Problem: Patient/Family Goals  Goal: Patient/Family Long Term Goal  Description: Patient's Long Term Goal: Gain speech and mobility back    Interventions:  - Follow plan of care/outpt rehab as needed  - See additional Care Plan goals for specific interventions  Outcome: Progressing  Goal: Patient/Family Short Term Goal  Description: Patient's Short Term Goal: Be discharged    Interventions:   - Follow plan of care  - See additional Care Plan goals for specific interventions  Outcome: Progressing

## 2022-11-08 NOTE — CM/SW NOTE
Care Progression Note:  Length of stay: 12  Avoidable Delays: None  Code Status: ***    Acute Medical Issue/Factors:   Cerebrovascular accident (CVA), unspecified mechanism ([facility] [address]       Discharge Barriers: {Discharge Barriers (Optional):8151}  Expected discharge date: ***   Expected next site of care: {Discharge Level of Care (Optional):8152}. / available for discharge planning.

## 2022-11-08 NOTE — SLP NOTE
Pt NPO and going for carotid revascularization of symptomatic LICA origin stenosis by endovascular stenting procedure. Will re-attempt follow up when appropriate and available.     Imani Whitmore [place], [id number] [facility]  Pager

## 2022-11-08 NOTE — ANESTHESIA PROCEDURE NOTES
Arterial Line    Date/Time: 11/8/2022 5:10 PM  Performed by: Christella Bumpers, MD  Authorized by: Christella Bumpers, MD     General Information and Staff    Procedure Start:  11/8/2022 5:10 PM  Procedure End:  11/8/2022 5:11 PM  Anesthesiologist:  Christella Bumpers, MD  Performed By:  Anesthesiologist  Patient Location:  OR  Indication: continuous blood pressure monitoring and blood sampling needed    Site Identification: real time ultrasound guided    Preanesthetic Checklist: 2 patient identifiers, IV checked, risks and benefits discussed, monitors and equipment checked, pre-op evaluation, timeout performed, anesthesia consent and sterile technique used    Procedure Details    Catheter Size:  20 G  Catheter Length:  1 and 3/4 inch  Catheter Type:  Arrow  Seldinger Technique?: Yes    Laterality:  Left  Site:  Radial artery  Site Prep: chlorhexidine    Line Secured:  Tegaderm and tape    Assessment    Events: patient tolerated procedure well with no complications      Medications  11/8/2022 5:10 PM      Additional Comments

## 2022-11-08 NOTE — PLAN OF CARE
Patient transferred to cath lab for procedure via bed. Accompanied by staff and family. All questions answered.

## 2022-11-08 NOTE — PLAN OF CARE
Assumed care of patient around 0730  Pt a&o 2-3, room air, VSS, tele NSR  10 second run of PAT/SVT, cards notified. No new orders  Amor in place, clear, emmanuel urine present  NPO since midnight, scheduled for carotid revasc today  Ultrasound rt arm negative for DVT  Denies pain  Maintain safety, call light within reach, all questions answered, family at bedside       Problem: Patient/Family Goals  Goal: Patient/Family Long Term Goal  Description: Patient's Long Term Goal: Gain speech and mobility back    Interventions:  - Follow plan of care/outpt rehab as needed  - See additional Care Plan goals for specific interventions  Outcome: Progressing  Goal: Patient/Family Short Term Goal  Description: Patient's Short Term Goal: Be discharged    Interventions:   - Follow plan of care  - See additional Care Plan goals for specific interventions  Outcome: Progressing     Problem: Delirium  Goal: Minimize duration of delirium  Description: Interventions:  - Encourage use of hearing aids, eye glasses  - Promote highest level of mobility daily  - Provide frequent reorientation  - Promote wakefulness i.e. lights on, blinds open  - Promote sleep, encourage patient's normal rest cycle i.e. lights off, TV off, minimize noise and interruptions  - Encourage family to assist in orientation and promotion of home routines  Outcome: Progressing     Problem: NEUROLOGICAL - ADULT  Goal: Achieves stable or improved neurological status  Description: INTERVENTIONS  - Assess for and report changes in neurological status  - Initiate measures to prevent increased intracranial pressure  - Maintain blood pressure and fluid volume within ordered parameters to optimize cerebral perfusion and minimize risk of hemorrhage  - Monitor temperature, glucose, and sodium.  Initiate appropriate interventions as ordered  Outcome: Progressing  Goal: Absence of seizures  Description: INTERVENTIONS  - Monitor for seizure activity  - Administer anti-seizure medications as ordered  - Monitor neurological status  Outcome: Progressing  Goal: Remains free of injury related to seizure activity  Description: INTERVENTIONS:  - Maintain airway, patient safety  and administer oxygen as ordered  - Monitor patient for seizure activity, document and report duration and description of seizure to MD/LIP  - If seizure occurs, turn patient to side and suction secretions as needed  - Reorient patient post seizure  - Seizure pads on all 4 side rails  - Instruct patient/family to notify RN of any seizure activity  - Instruct patient/family to call for assistance with activity based on assessment  Outcome: Progressing  Goal: Achieves maximal functionality and self care  Description: INTERVENTIONS  - Monitor swallowing and airway patency with patient fatigue and changes in neurological status  - Encourage and assist patient to increase activity and self care with guidance from PT/OT  - Encourage visually impaired, hearing impaired and aphasic patients to use assistive/communication devices  Outcome: Progressing

## 2022-11-08 NOTE — ANESTHESIA PROCEDURE NOTES
Peripheral IV  Date/Time: 11/8/2022 5:09 PM  Inserted by: Juliette Mercado MD    Placement  Needle size: 20 G  Laterality: right  Location: hand  Local anesthetic: none  Site prep: alcohol  Technique: anatomical landmarks  Attempts: 1

## 2022-11-08 NOTE — PHYSICAL THERAPY NOTE
Pt with duplicate PT order entered, pt already on caseload. Chart reviewed and noted carotid revascularization of symptomatic LICA origin stenosis by endovascular stenting was rescheduled for today. Will follow as appropriate.     Hemanth Hussein, PT  11/08/22

## 2022-11-08 NOTE — CM/SW NOTE
Berny referral checked for [employer], insurance asking for updates. SW sent in [address]. SW following.      Janneth Buenrostro, STEPHEN  Discharge 2011 [facility]

## 2022-11-08 NOTE — PLAN OF CARE
Patient is alert and oriented x 2-3 . Follows commands. Please see neuro flow sheet for deficits. On tele- he is sinus rhythm. Amor intact. He denies chest pain and shortness of breath. No signs of distress noted. Plan for Plan for carotid revascularization of symptomatic LICA origin stenosis by endovascular stenting today. Falls and safety precautions maintained. Problem: NEUROLOGICAL - ADULT  Goal: Achieves stable or improved neurological status  Description: INTERVENTIONS  - Assess for and report changes in neurological status  - Initiate measures to prevent increased intracranial pressure  - Maintain blood pressure and fluid volume within ordered parameters to optimize cerebral perfusion and minimize risk of hemorrhage  - Monitor temperature, glucose, and sodium.  Initiate appropriate interventions as ordered  Outcome: Progressing  Goal: Achieves maximal functionality and self care  Description: INTERVENTIONS  - Monitor swallowing and airway patency with patient fatigue and changes in neurological status  - Encourage and assist patient to increase activity and self care with guidance from PT/OT  - Encourage visually impaired, hearing impaired and aphasic patients to use assistive/communication devices  Outcome: Progressing     Problem: Patient/Family Goals  Goal: Patient/Family Long Term Goal  Description: Patient's Long Term Goal: Gain speech and mobility back    Interventions:  - Follow plan of care/outpt rehab as needed  - See additional Care Plan goals for specific interventions  Outcome: Progressing  Goal: Patient/Family Short Term Goal  Description: Patient's Short Term Goal: Be discharged    Interventions:   - Follow plan of care  - See additional Care Plan goals for specific interventions  Outcome: Progressing

## 2022-11-08 NOTE — OCCUPATIONAL THERAPY NOTE
Chart reviewed and noted carotid revascularization of symptomatic LICA origin stenosis by endovascular stenting was rescheduled for today. Patient is also pending venous doppler for RUE edema. Will hold today and continue to follow, resuming services as medically appropriate.

## 2022-11-08 NOTE — ANESTHESIA PROCEDURE NOTES
Peripheral IV  Date/Time: 11/8/2022 5:10 PM  Inserted by: Diana Chatman MD    Placement  Needle size: 20 G  Laterality: left  Location: arm  Local anesthetic: none  Site prep: alcohol  Technique: anatomical landmarks  Attempts: 1

## 2022-11-09 PROBLEM — I63.232 ACUTE LEFT ICA ISCHEMIC STROKE (HCC): Status: ACTIVE | Noted: 2022-10-27

## 2022-11-09 LAB
ANION GAP SERPL CALC-SCNC: 5 MMOL/L (ref 0–18)
ANION GAP SERPL CALC-SCNC: 5 MMOL/L (ref 0–18)
ASPIRIN PLT INHIBITION: 350 ARU (ref 620–672)
BUN BLD-MCNC: 19 MG/DL (ref 7–18)
BUN BLD-MCNC: 21 MG/DL (ref 7–18)
CALCIUM BLD-MCNC: 7.5 MG/DL (ref 8.5–10.1)
CALCIUM BLD-MCNC: 7.9 MG/DL (ref 8.5–10.1)
CHLORIDE SERPL-SCNC: 105 MMOL/L (ref 98–112)
CHLORIDE SERPL-SCNC: 107 MMOL/L (ref 98–112)
CO2 SERPL-SCNC: 23 MMOL/L (ref 21–32)
CO2 SERPL-SCNC: 26 MMOL/L (ref 21–32)
CREAT BLD-MCNC: 0.43 MG/DL
CREAT BLD-MCNC: 0.49 MG/DL
ERYTHROCYTE [DISTWIDTH] IN BLOOD BY AUTOMATED COUNT: 13.6 %
GFR SERPLBLD BASED ON 1.73 SQ M-ARVRAT: 101 ML/MIN/1.73M2 (ref 60–?)
GFR SERPLBLD BASED ON 1.73 SQ M-ARVRAT: 105 ML/MIN/1.73M2 (ref 60–?)
GLUCOSE BLD-MCNC: 106 MG/DL (ref 70–99)
GLUCOSE BLD-MCNC: 151 MG/DL (ref 70–99)
GLUCOSE BLD-MCNC: 175 MG/DL (ref 70–99)
GLUCOSE BLD-MCNC: 188 MG/DL (ref 70–99)
GLUCOSE BLD-MCNC: 79 MG/DL (ref 70–99)
GLUCOSE BLD-MCNC: 99 MG/DL (ref 70–99)
HCT VFR BLD AUTO: 32.6 %
HGB BLD-MCNC: 10.9 G/DL
ISTAT ACTIVATED CLOTTING TIME: 150 SECONDS (ref 74–137)
ISTAT ACTIVATED CLOTTING TIME: 167 SECONDS (ref 74–137)
ISTAT ACTIVATED CLOTTING TIME: 179 SECONDS (ref 74–137)
ISTAT ACTIVATED CLOTTING TIME: 578 SECONDS (ref 74–137)
ISTAT ACTIVATED CLOTTING TIME: >1000 SECONDS (ref 74–137)
MCH RBC QN AUTO: 31.6 PG (ref 26–34)
MCHC RBC AUTO-ENTMCNC: 33.4 G/DL (ref 31–37)
MCV RBC AUTO: 94.5 FL
OSMOLALITY SERPL CALC.SUM OF ELEC: 281 MOSM/KG (ref 275–295)
OSMOLALITY SERPL CALC.SUM OF ELEC: 285 MOSM/KG (ref 275–295)
P2Y12 REACTION UNITS: 119 PRU
PLATELET # BLD AUTO: 364 10(3)UL (ref 150–450)
POTASSIUM SERPL-SCNC: 3.7 MMOL/L (ref 3.5–5.1)
POTASSIUM SERPL-SCNC: 4.5 MMOL/L (ref 3.5–5.1)
RBC # BLD AUTO: 3.45 X10(6)UL
SODIUM SERPL-SCNC: 135 MMOL/L (ref 136–145)
SODIUM SERPL-SCNC: 136 MMOL/L (ref 136–145)
WBC # BLD AUTO: 8.2 X10(3) UL (ref 4–11)

## 2022-11-09 PROCEDURE — 99291 CRITICAL CARE FIRST HOUR: CPT | Performed by: INTERNAL MEDICINE

## 2022-11-09 PROCEDURE — 99232 SBSQ HOSP IP/OBS MODERATE 35: CPT

## 2022-11-09 RX ORDER — POTASSIUM CHLORIDE 14.9 MG/ML
20 INJECTION INTRAVENOUS ONCE
Status: COMPLETED | OUTPATIENT
Start: 2022-11-09 | End: 2022-11-09

## 2022-11-09 RX ORDER — HEPARIN SODIUM 5000 [USP'U]/ML
5000 INJECTION, SOLUTION INTRAVENOUS; SUBCUTANEOUS EVERY 12 HOURS SCHEDULED
Status: DISCONTINUED | OUTPATIENT
Start: 2022-11-09 | End: 2022-11-09 | Stop reason: ALTCHOICE

## 2022-11-09 NOTE — PLAN OF CARE
Assumed patient care at 0730. Patient resting in bed, VSS, on RA. 110-140 SBP, maintained. Afebrile. R groin site C/D/I, no hematoma.

## 2022-11-09 NOTE — PROCEDURES
[facility]  Interventional Neuroradiology Procedure Note      Procedure: Left Internal Carotid Artery Angioplasty/Stenting    Pre-Op Diagnosis: acute stroke / symptomatic LICA    Post-Op Diagnosis: s/p LICA angioplasty/stenting    Surgeon: Bethany Burns MD, PhD and Amy Sexton MD    Technique/Findings:    6F Right CFA Neuron  Sheath   5F 125 cm Wong catheter    Spider  6mm filter protection device  Aviator 4 x 30 mm balloon  Precise 8 x 30 mm carotid stent    Successful carotid angioplasty/stenting of high risk, symptomatic 60-70% proximal cervical LICA stenosis resulting in residual 30% stenosis    Tandem 50% distal cervical left internal carotid artery atherosclerotic stenosis. Diffuse intracranial atherosclerotic disease    6F Arrow sheath  Manual compression    Full report to follow    Anesthesia:  Local and MAC    Complications:  None    Medications: Ancef 2g IV  Heparin 3000 units IV    Blood Loss:  < 100 mL     Assessment/Plan:  87M with PMH  history of bilateral carotid artery stenosis, adrenal insufficiency and polyclonal gammopathy presented on 10/27 with NIH Stroke Scale 21 status post stroke thrombectomy of proximal cervical left internal carotid artery occlusion, due to a underlying 60-70% symptomatic carotid atherosclerotic stenosis in setting of poor [place] of [place] collaterals.  Subsequent MR DWI identified limited core infarction of the left parietal lobe and embolic/watershed infarcts of bilateral DUC and left MCA downstream to the suspected symptomatic left ICA carotid occlusion, but also transient Afib with RVR noted during hospitalization, now s/p LICA angioplasty/stenting    Neuro ICU monitoring - Neurochecks q1h  SBP goals < 140 mm Hg to prevent reperfusion or infarct transformation complications    Stroke Etiology/Prevention  Reconsult Neurology/Cardiology for evaluation of Afib and need to restart anticoagulation  Cardiac telemetry monitoring     Dual antiplatelets ASA 81mg / clopidogrel 75mg, repeat ASA/P2Y12 assays for efficacy  Carotid Doppler ultrasound for baseline for serial monitoring    DVT prophylaxis - heparin 5000U SQ BID    Stroke Rehabilitation  PT/OT, very aggressive physical therapy needed  Nutrition consult    Lay flat or reverse Trendelenburg 2 hours   Assess vitals/access site/pulses and neurological status in post-procedure unit    Annmarie Cerrato MD, PhD  11/8/2022  8:46 PM

## 2022-11-09 NOTE — PLAN OF CARE
Received Braeden from from neuro lob approx 20115 s/p L ICA angioplasty and stent; R femoral arterial sheath intact w/ elevated ACT. Initially drowsy but now alert but confused/ oriented x 2-3 ( person, place at times, able to tel me the month). R droop noted but w/ clear speech. Some mild aphasia with delayed response during conversation. RUQ of the eye is not responding to visual field threat. RUE paralysis 0/5. Pls refer to neuro flow sheet for full assessment. SB to SR. Cardene re-started for a short time to keep SBP <140; weaned off this morning ACT checked several times. R fem A- sheath pulled approx 0200 with ACT= 150 sec; [place] pressure applied for 7 minutes. At 0500 neuro check, has been uncooperative refusing to participate; verbalized \" Don't bother me. I am sleepy. \" [facility]N notified. At 0600 neuro check- unable to lift his LUE w/ motor strength 2/5 ( acute change). APN notified. At 0700, after repeated cueing, able to raise LUE 3/5 motor strength.

## 2022-11-10 VITALS
DIASTOLIC BLOOD PRESSURE: 47 MMHG | OXYGEN SATURATION: 95 % | TEMPERATURE: 98 F | RESPIRATION RATE: 15 BRPM | SYSTOLIC BLOOD PRESSURE: 118 MMHG | HEART RATE: 62 BPM | WEIGHT: 86.44 LBS | BODY MASS INDEX: 14 KG/M2

## 2022-11-10 LAB
ERYTHROCYTE [DISTWIDTH] IN BLOOD BY AUTOMATED COUNT: 13.6 %
GLUCOSE BLD-MCNC: 123 MG/DL (ref 70–99)
GLUCOSE BLD-MCNC: 166 MG/DL (ref 70–99)
HCT VFR BLD AUTO: 32.7 %
HGB BLD-MCNC: 11.1 G/DL
MCH RBC QN AUTO: 31.3 PG (ref 26–34)
MCHC RBC AUTO-ENTMCNC: 33.9 G/DL (ref 31–37)
MCV RBC AUTO: 92.1 FL
PLATELET # BLD AUTO: 374 10(3)UL (ref 150–450)
POTASSIUM SERPL-SCNC: 4.2 MMOL/L (ref 3.5–5.1)
RBC # BLD AUTO: 3.55 X10(6)UL
SARS-COV-2 RNA RESP QL NAA+PROBE: NOT DETECTED
WBC # BLD AUTO: 8.9 X10(3) UL (ref 4–11)

## 2022-11-10 PROCEDURE — 99232 SBSQ HOSP IP/OBS MODERATE 35: CPT

## 2022-11-10 PROCEDURE — XW023W7 INTRODUCTION OF COVID-19 VACCINE BOOSTER INTO MUSCLE, PERCUTANEOUS APPROACH, NEW TECHNOLOGY GROUP 7: ICD-10-PCS | Performed by: HOSPITALIST

## 2022-11-10 RX ORDER — MELATONIN
3 NIGHTLY PRN
Qty: 30 TABLET | Refills: 0 | Status: SHIPPED | COMMUNITY
Start: 2022-11-10

## 2022-11-10 RX ORDER — AMIODARONE HYDROCHLORIDE 200 MG/1
200 TABLET ORAL DAILY
Qty: 30 TABLET | Refills: 1 | Status: SHIPPED | OUTPATIENT
Start: 2022-11-11

## 2022-11-10 NOTE — PLAN OF CARE
Assumed care at 0730. Alert and orientated X3. Neuro's Q4, no acute changes. See flowsheets. NSR on tele, RA, VSS. Patient denies pain. Tolerating diet, adequate oral intake. Wife assisting w/ feedings. Eliquis resumed today. R groin c/d/i, dressing removed by neuro IR. Patient/family updated on plan of care. Bed/chair alarm on, call light within reach. Monitoring patient needs. Medically cleared for discharge. This RN went over discharge teachings w/ patient/family. Patient/family verbalizing understanding of discharge teachings. IV removed. Tele removed. NURSING DISCHARGE NOTE    Discharged Rehab facility via Ambulance. Accompanied by Family member  Belongings Taken by patient/family. This RN called to give report to Mushtaq Mehta at [facility] @ [phone].

## 2022-11-10 NOTE — PLAN OF CARE
Received patient approximately 1200  Patient alert to self, following some commands  On room air, VSS, NSR on tele  Q4 neuro, no new deficits, see flowsheets  Tolerating diet, poor appetite   Max assist to turn side to side in bed   Patient has not voided, bladder scan X2, has not met parameter to be straight cathed, see flowsheets  Plan to resume eliquis tomorrow and stop plavix   Likely discharge to [facility] tomorrow   Patient and family at bedside updated on plan of care     Problem: NEUROLOGICAL - ADULT  Goal: Achieves stable or improved neurological status  Description: INTERVENTIONS  - Assess for and report changes in neurological status  - Initiate measures to prevent increased intracranial pressure  - Maintain blood pressure and fluid volume within ordered parameters to optimize cerebral perfusion and minimize risk of hemorrhage  - Monitor temperature, glucose, and sodium.  Initiate appropriate interventions as ordered  Outcome: Progressing  Goal: Absence of seizures  Description: INTERVENTIONS  - Monitor for seizure activity  - Administer anti-seizure medications as ordered  - Monitor neurological status  Outcome: Progressing  Goal: Remains free of injury related to seizure activity  Description: INTERVENTIONS:  - Maintain airway, patient safety  and administer oxygen as ordered  - Monitor patient for seizure activity, document and report duration and description of seizure to MD/LIP  - If seizure occurs, turn patient to side and suction secretions as needed  - Reorient patient post seizure  - Seizure pads on all 4 side rails  - Instruct patient/family to notify RN of any seizure activity  - Instruct patient/family to call for assistance with activity based on assessment  Outcome: Progressing  Goal: Achieves maximal functionality and self care  Description: INTERVENTIONS  - Monitor swallowing and airway patency with patient fatigue and changes in neurological status  - Encourage and assist patient to increase activity and self care with guidance from PT/OT  - Encourage visually impaired, hearing impaired and aphasic patients to use assistive/communication devices  Outcome: Progressing     Problem: Delirium  Goal: Minimize duration of delirium  Description: Interventions:  - Encourage use of hearing aids, eye glasses  - Promote highest level of mobility daily  - Provide frequent reorientation  - Promote wakefulness i.e. lights on, blinds open  - Promote sleep, encourage patient's normal rest cycle i.e. lights off, TV off, minimize noise and interruptions  - Encourage family to assist in orientation and promotion of home routines  Outcome: Progressing

## 2022-11-10 NOTE — PLAN OF CARE
Assumed care at 1900. Aox2. NSR. RA.  BP high; labetalol x1  Neuro checks q 4  Denies pain. Poor oral intake  Decreased urine output; straight cathed, got 190ml out. Stop plavix  R groin site;CDI  Discharge to [facility] 11/10 if cleared.

## 2022-11-10 NOTE — CM/SW NOTE
Insurance auth received for [employer]. Discussed with RN, not ready for dc yet. SW met with wife to discuss, possible dc Thursday. Wife inquired on dc in afternoon so sons can be present. SW able to accommodate. Will meet with pt, spouse tomorrow and follow up on dc plans. SW to follow up tomorrow.      Salud Glover, LSW  Discharge 2011 [facility]

## 2022-11-10 NOTE — CM/SW NOTE
Pt discussed in rounds, pt expected to dc today. SW went to meet with pt, pt alone. States he is feeling well and asking for wife. Discussed with pt dc to rehab today- acknowledged that sons will be there to help. SW left message for son, Eduardo De La Torre to discuss. SW set up transport for 4:30 pm, PCS completed. RN aware. SW to follow up this afternoon on dc plans. RN to call [phone] for report.     STEPHEN Dobbins  Discharge 2011 [facility]

## 2022-11-17 LAB
ISTAT ACTIVATED CLOTTING TIME: 149 SECONDS (ref 74–137)
ISTAT ACTIVATED CLOTTING TIME: 208 SECONDS (ref 74–137)
ISTAT ACTIVATED CLOTTING TIME: 231 SECONDS (ref 74–137)
ISTAT ACTIVATED CLOTTING TIME: 242 SECONDS (ref 74–137)
ISTAT ACTIVATED CLOTTING TIME: 254 SECONDS (ref 74–137)

## 2022-11-28 ENCOUNTER — TELEPHONE (OUTPATIENT)
Dept: NEUROLOGY | Facility: CLINIC | Age: 87
End: 2022-11-28

## 2022-11-28 NOTE — TELEPHONE ENCOUNTER
RN called patient's son and recommended follow up appt be kept as patient was IP for a CVA on 10/27/2022. Patient's son states that he wants provider to come to facility. RN informed patient's son that provider does not travel to alternate facilities. Patient son had many questions on post stroke care and RN advised that appt would be most appropriate place to have questions answered. RN also advised to keep appt for continued follow up care post stroke. Patient's son requests that appt be cancelled. RN cancelled appt with Dr. Tamra Carr.

## 2022-11-28 NOTE — TELEPHONE ENCOUNTER
Pts son calling regarding upcoming appt on 12/19/22. He states the condition of the pt has gotten worse and he doesn't think it will be helpful to have an ambulance bring him to this appt. He has an appt on 12/13/22 with Dr. Alaina Gonzalez that he feels wont be beneficial for pt to attend and would like to cancel these appts.

## 2022-11-28 NOTE — TELEPHONE ENCOUNTER
Pts son calling regarding upcoming appt on 12/19/22. He states the condition of the pt has gotten worse and he doesn't think it will be helpful to have an ambulance bring him to this appt. He has an appt on 12/13/22 with Dr. Angi Ramires that he feels wont be beneficial for pt to attend and would like to cancel these appts.

## 2022-11-29 NOTE — TELEPHONE ENCOUNTER
Received feedback from BERNARDA Thibodeaux and called patient's son to inform. Left detailed message on personalized voicemail that Dr. Marielena Mcarthur agrees to canceling appointment if family wishes to do so. Datameert message sent as well.

## 2022-11-29 NOTE — TELEPHONE ENCOUNTER
Discussed with Dr. Savannah Florian. Ok to cancel appointment. Per the family's wishes, the patient does not require any further [facility] follow up. Thank you!

## (undated) NOTE — IP AVS SNAPSHOT
[address]            (For Outpatient Use Only) Initial Admit Date: 10/27/2022   Inpt/Obs Admit Date: Inpt: 10/27/22 / Obs: N/A   Discharge Date:    Ade Bennett:  [de-identified]   MRN: [de-identified]   CSN: [id number]   CEID: [id number]        ENCOUNTER  Patient Class: Inpatient Admitting Provider: Jax Vasquez MD Unit: [facility] Service: Cardiac Telemetry Attending Provider: Alie Duong MD   Bed: 1409-S   Visit Type:   Referring Physician: No ref. provider found Billing Flag:    Admit Diagnosis: Cerebrovascular accident (CVA), unspecified mechanism [facility]) [I63.9]      PATIENT  Legal Name:   Jigar Wilcox    Legal Sex: Male  Gender ID:              Pref Name:    PCP:  Martin aHwk MD Home: [phone]   Address:  [address] : 1935 (87 yrs) Mobile: [phone]         City/State/Zip: [address] Marital:  Language: Kelly Shield: Jose SSN4: [id number] Restorationist: Wishes Privacy     Race: Other Ethnicity: Non  Or [address] [place] [address] Street   Name Relationship Legal Guardian? Home Phone Work Phone Mobile Phone   1. Alison Rene  2.  [facility] Spouse  Son    [id number]  [address]: Gerald Whitt : 1935 Home Phone: [phone]   Address: [address]  Sex: Male Work Phone:    City/State/Zip: [address]   Rel. to Patient: Self Guarantor ID: [id number]   GUARANTOR EMPLOYER   Employer:  Status: RETIRED     COVERAGE  PRIMARY INSURANCE   PayorMalnwillie Jennie [facility] Plan: HU[employer] MEDICARE ADV PPO   Group Number: [id number] Insurance Type: [id number] Name: Jonel Evans : 1935   Subscriber ID: [id number] Pt Rel to Subscriber: Self   SECONDARY INSURANCE   Payor:  Plan:    Group Number:  Insurance Type:    Subscriber Name:  Subscriber :    Subscriber ID:  Pt Rel to Subscriber:    TERTIARY INSURANCE   Payor:  Plan:    Group Number:  Insurance Type:    Subscriber Name:  Subscriber :    Subscriber ID:  Pt Rel to Subscriber:    Hospital Account Financial Class: Medicare Advantage    November 10, 2022